# Patient Record
Sex: FEMALE | Race: WHITE | NOT HISPANIC OR LATINO | Employment: FULL TIME | URBAN - METROPOLITAN AREA
[De-identification: names, ages, dates, MRNs, and addresses within clinical notes are randomized per-mention and may not be internally consistent; named-entity substitution may affect disease eponyms.]

---

## 2023-12-04 ENCOUNTER — APPOINTMENT (OUTPATIENT)
Dept: OBSTETRICS AND GYNECOLOGY | Facility: CLINIC | Age: 28
End: 2023-12-04
Payer: COMMERCIAL

## 2023-12-05 ENCOUNTER — TELEPHONE (OUTPATIENT)
Dept: OBSTETRICS AND GYNECOLOGY | Facility: CLINIC | Age: 28
End: 2023-12-05
Payer: COMMERCIAL

## 2023-12-05 ENCOUNTER — APPOINTMENT (OUTPATIENT)
Dept: OBSTETRICS AND GYNECOLOGY | Facility: CLINIC | Age: 28
End: 2023-12-05
Payer: COMMERCIAL

## 2023-12-05 ENCOUNTER — INITIAL PRENATAL (OUTPATIENT)
Dept: OBSTETRICS AND GYNECOLOGY | Facility: CLINIC | Age: 28
End: 2023-12-05
Payer: COMMERCIAL

## 2023-12-05 DIAGNOSIS — Z34.01 ENCOUNTER FOR SUPERVISION OF NORMAL FIRST PREGNANCY IN FIRST TRIMESTER (HHS-HCC): Primary | ICD-10-CM

## 2023-12-05 DIAGNOSIS — Z34.90 PRENATAL CARE, ANTEPARTUM (HHS-HCC): ICD-10-CM

## 2023-12-05 DIAGNOSIS — Z3A.08 8 WEEKS GESTATION OF PREGNANCY (HHS-HCC): ICD-10-CM

## 2023-12-05 PROCEDURE — 87086 URINE CULTURE/COLONY COUNT: CPT

## 2023-12-05 PROCEDURE — 87591 N.GONORRHOEAE DNA AMP PROB: CPT

## 2023-12-05 PROCEDURE — 87491 CHLMYD TRACH DNA AMP PROBE: CPT

## 2023-12-05 PROCEDURE — 87800 DETECT AGNT MULT DNA DIREC: CPT

## 2023-12-05 PROCEDURE — 0500F INITIAL PRENATAL CARE VISIT: CPT | Performed by: OBSTETRICS & GYNECOLOGY

## 2023-12-05 NOTE — LETTER
December 5, 2023     Patient: Snehal Ball   YOB: 1995   Date of Visit: 12/5/2023       To Whom It May Concern:    Snehal Ball was seen in my clinic on 12/5/2023 at ?. Please excuse Snehal for her absence from work on this day to make the appointment.    If you have any questions or concerns, please don't hesitate to call.         Sincerely,         Libia Negrete MD        CC: No Recipients

## 2023-12-06 VITALS
SYSTOLIC BLOOD PRESSURE: 130 MMHG | BODY MASS INDEX: 22.77 KG/M2 | DIASTOLIC BLOOD PRESSURE: 70 MMHG | WEIGHT: 133.4 LBS | HEIGHT: 64 IN

## 2023-12-06 PROBLEM — Z34.01 ENCOUNTER FOR SUPERVISION OF NORMAL FIRST PREGNANCY IN FIRST TRIMESTER (HHS-HCC): Status: ACTIVE | Noted: 2023-12-06

## 2023-12-06 LAB
C TRACH RRNA SPEC QL NAA+PROBE: NEGATIVE
N GONORRHOEA DNA SPEC QL PROBE+SIG AMP: NEGATIVE

## 2023-12-06 NOTE — PROGRESS NOTES
28 y.o.  at 8.2 weeks gestational age by sure LMP.  Planned and desired pregnancy.  USN done today - CRL c/w LMP dating.  EDC per LMP - .  PMH/PSH uncomplicated.  Has nausea but tolerating.  Taking OTC PNV.   S/p flu vaccine.  Updated COVID vaccine recommended. BMI today 23.  Discussed optimal weight gain in pregnancy.  Discussed genetic screening options and carrier screening - unsure - will think about it prior to next visit.  Routine labs next visit as well. Discussed collaborative care model and group practice.  Questions answered.

## 2023-12-07 LAB — BACTERIA UR CULT: NO GROWTH

## 2024-01-08 ENCOUNTER — ANCILLARY PROCEDURE (OUTPATIENT)
Dept: RADIOLOGY | Facility: CLINIC | Age: 29
End: 2024-01-08
Payer: COMMERCIAL

## 2024-01-08 DIAGNOSIS — Z34.90 PRENATAL CARE, ANTEPARTUM (HHS-HCC): ICD-10-CM

## 2024-01-08 DIAGNOSIS — Z36.82 NUCHAL TRANSLUCENCY OF FETUS ON PRENATAL ULTRASOUND (HHS-HCC): ICD-10-CM

## 2024-01-08 PROCEDURE — 76813 OB US NUCHAL MEAS 1 GEST: CPT | Performed by: OBSTETRICS & GYNECOLOGY

## 2024-01-08 PROCEDURE — 76801 OB US < 14 WKS SINGLE FETUS: CPT

## 2024-01-08 PROCEDURE — 76813 OB US NUCHAL MEAS 1 GEST: CPT

## 2024-01-09 ENCOUNTER — ROUTINE PRENATAL (OUTPATIENT)
Dept: OBSTETRICS AND GYNECOLOGY | Facility: CLINIC | Age: 29
End: 2024-01-09
Payer: COMMERCIAL

## 2024-01-09 VITALS — BODY MASS INDEX: 23.72 KG/M2 | WEIGHT: 138.2 LBS | DIASTOLIC BLOOD PRESSURE: 60 MMHG | SYSTOLIC BLOOD PRESSURE: 112 MMHG

## 2024-01-09 DIAGNOSIS — Z3A.13 13 WEEKS GESTATION OF PREGNANCY (HHS-HCC): ICD-10-CM

## 2024-01-09 DIAGNOSIS — Z34.01 ENCOUNTER FOR SUPERVISION OF NORMAL FIRST PREGNANCY IN FIRST TRIMESTER (HHS-HCC): ICD-10-CM

## 2024-01-09 DIAGNOSIS — Z34.90 PRENATAL CARE, ANTEPARTUM (HHS-HCC): Primary | ICD-10-CM

## 2024-01-09 LAB
ABO GROUP (TYPE) IN BLOOD: NORMAL
ANTIBODY SCREEN: NORMAL
ERYTHROCYTE [DISTWIDTH] IN BLOOD BY AUTOMATED COUNT: 12.5 % (ref 11.5–14.5)
HBV SURFACE AG SERPL QL IA: NONREACTIVE
HCT VFR BLD AUTO: 39.6 % (ref 36–46)
HCV AB SER QL: NONREACTIVE
HGB BLD-MCNC: 13.7 G/DL (ref 12–16)
HIV 1+2 AB+HIV1 P24 AG SERPL QL IA: NONREACTIVE
MCH RBC QN AUTO: 30.1 PG (ref 26–34)
MCHC RBC AUTO-ENTMCNC: 34.6 G/DL (ref 32–36)
MCV RBC AUTO: 87 FL (ref 80–100)
NRBC BLD-RTO: 0 /100 WBCS (ref 0–0)
PLATELET # BLD AUTO: 256 X10*3/UL (ref 150–450)
RBC # BLD AUTO: 4.55 X10*6/UL (ref 4–5.2)
REFLEX ADDED, ANEMIA PANEL: NORMAL
RH FACTOR (ANTIGEN D): NORMAL
RUBV IGG SERPL IA-ACNC: 1.2 IA
RUBV IGG SERPL QL IA: POSITIVE
T PALLIDUM AB SER QL: NONREACTIVE
WBC # BLD AUTO: 7.6 X10*3/UL (ref 4.4–11.3)

## 2024-01-09 PROCEDURE — 36415 COLL VENOUS BLD VENIPUNCTURE: CPT

## 2024-01-09 PROCEDURE — 86780 TREPONEMA PALLIDUM: CPT

## 2024-01-09 PROCEDURE — 87340 HEPATITIS B SURFACE AG IA: CPT

## 2024-01-09 PROCEDURE — 87389 HIV-1 AG W/HIV-1&-2 AB AG IA: CPT

## 2024-01-09 PROCEDURE — 86317 IMMUNOASSAY INFECTIOUS AGENT: CPT

## 2024-01-09 PROCEDURE — 86803 HEPATITIS C AB TEST: CPT

## 2024-01-09 PROCEDURE — 0501F PRENATAL FLOW SHEET: CPT | Performed by: OBSTETRICS & GYNECOLOGY

## 2024-01-09 PROCEDURE — 86900 BLOOD TYPING SEROLOGIC ABO: CPT

## 2024-01-09 PROCEDURE — 86901 BLOOD TYPING SEROLOGIC RH(D): CPT

## 2024-01-09 PROCEDURE — 86850 RBC ANTIBODY SCREEN: CPT

## 2024-01-09 PROCEDURE — 85027 COMPLETE CBC AUTOMATED: CPT

## 2024-01-09 NOTE — PROGRESS NOTES
OB labs today with NIPT    Sara Ch MA II    Routine prenatal visit   Feeling well now.  Much improved over the past 1-2 weeks. Had 13 week USN yesterday - WNL.  Routine prenatal labs and NIPS today.  Declines carrier screening. Not candidate for ASA.      Medical Problems       Problem List       Encounter for supervision of normal first pregnancy in first trimester    Overview Signed 12/6/2023  5:20 PM by Libia Negrete MD     - s/p flu vaccine  - Updated COVID vaccine recommended  <> 13 week USN             Follow up in 4 week(s).

## 2024-02-06 ENCOUNTER — ROUTINE PRENATAL (OUTPATIENT)
Dept: OBSTETRICS AND GYNECOLOGY | Facility: CLINIC | Age: 29
End: 2024-02-06
Payer: COMMERCIAL

## 2024-02-06 VITALS
WEIGHT: 142.38 LBS | SYSTOLIC BLOOD PRESSURE: 112 MMHG | DIASTOLIC BLOOD PRESSURE: 72 MMHG | BODY MASS INDEX: 24.44 KG/M2

## 2024-02-06 DIAGNOSIS — R35.0 URINARY FREQUENCY: ICD-10-CM

## 2024-02-06 DIAGNOSIS — Z3A.17 17 WEEKS GESTATION OF PREGNANCY (HHS-HCC): ICD-10-CM

## 2024-02-06 DIAGNOSIS — Z34.02 ENCOUNTER FOR SUPERVISION OF NORMAL FIRST PREGNANCY IN SECOND TRIMESTER (HHS-HCC): Primary | ICD-10-CM

## 2024-02-06 LAB
POC APPEARANCE, URINE: CLEAR
POC BILIRUBIN, URINE: NEGATIVE
POC BLOOD, URINE: NEGATIVE
POC COLOR, URINE: YELLOW
POC GLUCOSE, URINE: NEGATIVE MG/DL
POC KETONES, URINE: NEGATIVE MG/DL
POC LEUKOCYTES, URINE: NEGATIVE
POC NITRITE,URINE: NEGATIVE
POC PH, URINE: 6 PH
POC PROTEIN, URINE: NEGATIVE MG/DL
POC SPECIFIC GRAVITY, URINE: 1.01
POC UROBILINOGEN, URINE: 0.2 EU/DL

## 2024-02-06 PROCEDURE — 81003 URINALYSIS AUTO W/O SCOPE: CPT | Performed by: OBSTETRICS & GYNECOLOGY

## 2024-02-06 PROCEDURE — 0501F PRENATAL FLOW SHEET: CPT | Performed by: OBSTETRICS & GYNECOLOGY

## 2024-02-06 NOTE — PROGRESS NOTES
"Patient complains of urinary frequency and some vaginal discomfort.    Pauline Hobbs MA    Routine prenatal visit   Pt complains of uncomfortable sensation yesterday and felt like she was voiding frequently.  She also had some mild vaginal discomfort.  It seems better today. No change in her discharge.  Denies odor.      Speculum exam done today - small amount of physiologic discharge.  UA in office remarkable.  Given that symptoms resolved today will manage expectantly.  Encouraged her to call if her urinary symptoms recur - will send urine cx at that time.     Reviewed normal 13 week USN.  Has anatomy USN scheduled. Risk-reducing NIPs - it's a GIRL.     Medical Problems       Problem List       Encounter for supervision of normal first pregnancy in first trimester    Overview Addendum 2/6/2024  1:50 PM by Libia Negrete MD     - s/p flu vaccine  - Updated COVID vaccine recommended  - Normal 13 week USN   <> Anatomy USN scheduled   - Risk reducing NIPS   - It's a GIRL - \"Cassi\"             Follow up in 4 week(s).    "

## 2024-02-19 ENCOUNTER — HOSPITAL ENCOUNTER (OUTPATIENT)
Dept: RADIOLOGY | Facility: CLINIC | Age: 29
Discharge: HOME | End: 2024-02-19
Payer: COMMERCIAL

## 2024-02-19 DIAGNOSIS — Z34.90 PRENATAL CARE, ANTEPARTUM (HHS-HCC): ICD-10-CM

## 2024-02-19 PROCEDURE — 76815 OB US LIMITED FETUS(S): CPT | Performed by: OBSTETRICS & GYNECOLOGY

## 2024-02-19 PROCEDURE — 76805 OB US >/= 14 WKS SNGL FETUS: CPT

## 2024-03-05 ENCOUNTER — HOSPITAL ENCOUNTER (OUTPATIENT)
Dept: RADIOLOGY | Facility: CLINIC | Age: 29
Discharge: HOME | End: 2024-03-05
Payer: COMMERCIAL

## 2024-03-05 DIAGNOSIS — Z34.90 PRENATAL CARE, ANTEPARTUM (HHS-HCC): ICD-10-CM

## 2024-03-05 PROCEDURE — 76816 OB US FOLLOW-UP PER FETUS: CPT

## 2024-03-05 PROCEDURE — 76816 OB US FOLLOW-UP PER FETUS: CPT | Performed by: STUDENT IN AN ORGANIZED HEALTH CARE EDUCATION/TRAINING PROGRAM

## 2024-03-06 ENCOUNTER — ROUTINE PRENATAL (OUTPATIENT)
Dept: OBSTETRICS AND GYNECOLOGY | Facility: CLINIC | Age: 29
End: 2024-03-06
Payer: COMMERCIAL

## 2024-03-06 VITALS — SYSTOLIC BLOOD PRESSURE: 116 MMHG | WEIGHT: 152 LBS | BODY MASS INDEX: 26.09 KG/M2 | DIASTOLIC BLOOD PRESSURE: 66 MMHG

## 2024-03-06 DIAGNOSIS — Z3A.21 21 WEEKS GESTATION OF PREGNANCY (HHS-HCC): ICD-10-CM

## 2024-03-06 DIAGNOSIS — Z34.02 ENCOUNTER FOR SUPERVISION OF NORMAL FIRST PREGNANCY IN SECOND TRIMESTER (HHS-HCC): Primary | ICD-10-CM

## 2024-03-06 PROCEDURE — 0501F PRENATAL FLOW SHEET: CPT | Performed by: OBSTETRICS & GYNECOLOGY

## 2024-03-06 NOTE — PROGRESS NOTES
"Glucose and 28 week folder given for next visit.  C/O: None     Sara Ch MA II    Routine prenatal visit   No complaints.  Feeling FM daily now.  Has had 2 anatomy USNs but still incomplete - f/u USN scheduled.  Given glucola supplies for next visit.     Medical Problems       Problem List       Encounter for supervision of normal first pregnancy in first trimester    Overview Addendum 3/6/2024  5:07 PM by Libia Negrete MD     - s/p flu vaccine  - Updated COVID vaccine recommended  - Normal 13 week USN    - Risk reducing NIPS   - It's a GIRL - \"Cassi\"    - incomplete anatomy USN x 2 --> f/u USN scheduled              Follow up in 4 week(s).    "

## 2024-03-19 ENCOUNTER — HOSPITAL ENCOUNTER (OUTPATIENT)
Dept: RADIOLOGY | Facility: CLINIC | Age: 29
Discharge: HOME | End: 2024-03-19
Payer: COMMERCIAL

## 2024-03-19 DIAGNOSIS — Z34.90 PRENATAL CARE, ANTEPARTUM (HHS-HCC): ICD-10-CM

## 2024-03-19 PROCEDURE — 76816 OB US FOLLOW-UP PER FETUS: CPT | Performed by: OBSTETRICS & GYNECOLOGY

## 2024-03-19 PROCEDURE — 76816 OB US FOLLOW-UP PER FETUS: CPT

## 2024-04-05 ENCOUNTER — ROUTINE PRENATAL (OUTPATIENT)
Dept: OBSTETRICS AND GYNECOLOGY | Facility: CLINIC | Age: 29
End: 2024-04-05
Payer: COMMERCIAL

## 2024-04-05 VITALS
WEIGHT: 158.38 LBS | SYSTOLIC BLOOD PRESSURE: 112 MMHG | DIASTOLIC BLOOD PRESSURE: 60 MMHG | BODY MASS INDEX: 27.19 KG/M2

## 2024-04-05 DIAGNOSIS — Z34.01 ENCOUNTER FOR SUPERVISION OF NORMAL FIRST PREGNANCY IN FIRST TRIMESTER (HHS-HCC): ICD-10-CM

## 2024-04-05 DIAGNOSIS — Z3A.25 25 WEEKS GESTATION OF PREGNANCY (HHS-HCC): Primary | ICD-10-CM

## 2024-04-05 DIAGNOSIS — Z13.1 SCREENING FOR DIABETES MELLITUS (DM): ICD-10-CM

## 2024-04-05 LAB
ABO GROUP (TYPE) IN BLOOD: NORMAL
ANTIBODY SCREEN: NORMAL
ERYTHROCYTE [DISTWIDTH] IN BLOOD BY AUTOMATED COUNT: 13 % (ref 11.5–14.5)
GLUCOSE 1H P 50 G GLC PO SERPL-MCNC: 74 MG/DL
HCT VFR BLD AUTO: 33.6 % (ref 36–46)
HGB BLD-MCNC: 11.4 G/DL (ref 12–16)
MCH RBC QN AUTO: 30.2 PG (ref 26–34)
MCHC RBC AUTO-ENTMCNC: 33.9 G/DL (ref 32–36)
MCV RBC AUTO: 89 FL (ref 80–100)
NRBC BLD-RTO: 0 /100 WBCS (ref 0–0)
PLATELET # BLD AUTO: 193 X10*3/UL (ref 150–450)
RBC # BLD AUTO: 3.78 X10*6/UL (ref 4–5.2)
RH FACTOR (ANTIGEN D): NORMAL
WBC # BLD AUTO: 8.6 X10*3/UL (ref 4.4–11.3)

## 2024-04-05 PROCEDURE — 86900 BLOOD TYPING SEROLOGIC ABO: CPT

## 2024-04-05 PROCEDURE — 85027 COMPLETE CBC AUTOMATED: CPT

## 2024-04-05 PROCEDURE — 0501F PRENATAL FLOW SHEET: CPT

## 2024-04-05 PROCEDURE — 86850 RBC ANTIBODY SCREEN: CPT

## 2024-04-05 PROCEDURE — 82947 ASSAY GLUCOSE BLOOD QUANT: CPT

## 2024-04-05 PROCEDURE — 86901 BLOOD TYPING SEROLOGIC RH(D): CPT

## 2024-04-05 PROCEDURE — 36415 COLL VENOUS BLD VENIPUNCTURE: CPT

## 2024-04-05 ASSESSMENT — EDINBURGH POSTNATAL DEPRESSION SCALE (EPDS)
I HAVE BEEN SO UNHAPPY THAT I HAVE BEEN CRYING: NO, NEVER
THINGS HAVE BEEN GETTING ON TOP OF ME: NO, I HAVE BEEN COPING AS WELL AS EVER
TOTAL SCORE: 6
I HAVE BEEN ANXIOUS OR WORRIED FOR NO GOOD REASON: YES, SOMETIMES
I HAVE BEEN SO UNHAPPY THAT I HAVE HAD DIFFICULTY SLEEPING: NOT AT ALL
I HAVE BEEN ABLE TO LAUGH AND SEE THE FUNNY SIDE OF THINGS: AS MUCH AS I ALWAYS COULD
I HAVE FELT SCARED OR PANICKY FOR NO GOOD REASON: YES, SOMETIMES
I HAVE FELT SAD OR MISERABLE: NO, NOT AT ALL
I HAVE LOOKED FORWARD WITH ENJOYMENT TO THINGS: AS MUCH AS I EVER DID
I HAVE BLAMED MYSELF UNNECESSARILY WHEN THINGS WENT WRONG: YES, SOME OF THE TIME
THE THOUGHT OF HARMING MYSELF HAS OCCURRED TO ME: NEVER

## 2024-04-05 NOTE — PROGRESS NOTES
"Layla Ball is a 29 y.o.  at 25w5d with a working estimated date of delivery of 2024, by Last Menstrual Period who presents for a routine prenatal visit. She denies vaginal bleeding, leakage of fluid, or contractions. Patient reports feeling fetal movement and compliance with PNV.     Review of completed anatomy scan shows decreased interval growth.  Recommended that patient have repeat growth scan in 3 weeks.  Growth scan not yet scheduled patient instructed on how to schedule.  Current Outpatient Medications on File Prior to Visit   Medication Sig Dispense Refill    PNV 12-iron-methylfolate-dha 29 mg iron-1 mg -350 mg comb pack,tablet DR,capsule DR Take 1 tablet by mouth once daily.       No current facility-administered medications on file prior to visit.        Her pregnancy is complicated by:  Medical Problems       Problem List       Encounter for supervision of normal first pregnancy in first trimester    Overview Addendum 3/6/2024  5:07 PM by Libia Negrete MD     - s/p flu vaccine  - Updated COVID vaccine recommended  - Normal 13 week USN    - Risk reducing NIPS   - It's a GIRL - \"Cassi\"    - incomplete anatomy USN x 2 --> f/u USN scheduled                 Objective   Weight: 71.8 kg (158 lb 6 oz)  Expected Total Weight Gain: 11.5 kg (25 lb)-16 kg (35 lb)   TW kg (26 lb 6 oz)  Pregravid BMI: 22.65      BP: 112/60          Prenatal Labs:  Lab Results   Component Value Date    HGB 13.7 2024    HCT 39.6 2024     2024    ABO O 2024    LABRH NEG 2024    NEISSGONOAMP Negative 2023    CHLAMTRACAMP Negative 2023    SYPHT Nonreactive 2024    HEPBSAG Nonreactive 2024    HIV1X2 Nonreactive 2024    URINECULTURE No growth 2023       Assessment/Plan   One hour and CBC today  TDAP and rhogam at next visit  Reviewed growth scan  Follow up in 3 weeks for a routine prenatal visit.    Jocelyn Russ, " APRN-CNM

## 2024-04-06 LAB — REFLEX ADDED, ANEMIA PANEL: NORMAL

## 2024-04-17 ENCOUNTER — HOSPITAL ENCOUNTER (OUTPATIENT)
Dept: RADIOLOGY | Facility: CLINIC | Age: 29
Discharge: HOME | End: 2024-04-17
Payer: COMMERCIAL

## 2024-04-17 DIAGNOSIS — Z34.90 PRENATAL CARE, ANTEPARTUM (HHS-HCC): ICD-10-CM

## 2024-04-17 PROCEDURE — 76819 FETAL BIOPHYS PROFIL W/O NST: CPT | Performed by: STUDENT IN AN ORGANIZED HEALTH CARE EDUCATION/TRAINING PROGRAM

## 2024-04-17 PROCEDURE — 76819 FETAL BIOPHYS PROFIL W/O NST: CPT

## 2024-04-17 PROCEDURE — 76816 OB US FOLLOW-UP PER FETUS: CPT

## 2024-04-17 PROCEDURE — 76816 OB US FOLLOW-UP PER FETUS: CPT | Performed by: STUDENT IN AN ORGANIZED HEALTH CARE EDUCATION/TRAINING PROGRAM

## 2024-04-25 ENCOUNTER — ROUTINE PRENATAL (OUTPATIENT)
Dept: OBSTETRICS AND GYNECOLOGY | Facility: CLINIC | Age: 29
End: 2024-04-25
Payer: COMMERCIAL

## 2024-04-25 VITALS — BODY MASS INDEX: 28.6 KG/M2 | DIASTOLIC BLOOD PRESSURE: 60 MMHG | WEIGHT: 166.6 LBS | SYSTOLIC BLOOD PRESSURE: 132 MMHG

## 2024-04-25 DIAGNOSIS — Z34.01 ENCOUNTER FOR SUPERVISION OF NORMAL FIRST PREGNANCY IN FIRST TRIMESTER (HHS-HCC): ICD-10-CM

## 2024-04-25 DIAGNOSIS — Z3A.28 28 WEEKS GESTATION OF PREGNANCY (HHS-HCC): ICD-10-CM

## 2024-04-25 LAB
ABO GROUP (TYPE) IN BLOOD: NORMAL
ANTIBODY SCREEN: NORMAL
RH FACTOR (ANTIGEN D): NORMAL

## 2024-04-25 PROCEDURE — 86850 RBC ANTIBODY SCREEN: CPT

## 2024-04-25 PROCEDURE — 90715 TDAP VACCINE 7 YRS/> IM: CPT | Performed by: ADVANCED PRACTICE MIDWIFE

## 2024-04-25 PROCEDURE — 86900 BLOOD TYPING SEROLOGIC ABO: CPT

## 2024-04-25 PROCEDURE — 90471 IMMUNIZATION ADMIN: CPT | Performed by: ADVANCED PRACTICE MIDWIFE

## 2024-04-25 PROCEDURE — 36415 COLL VENOUS BLD VENIPUNCTURE: CPT

## 2024-04-25 PROCEDURE — 86901 BLOOD TYPING SEROLOGIC RH(D): CPT

## 2024-04-25 PROCEDURE — 96372 THER/PROPH/DIAG INJ SC/IM: CPT | Performed by: ADVANCED PRACTICE MIDWIFE

## 2024-04-25 NOTE — PROGRESS NOTES
Pt presents today for TDAP and Rhogam vacc     Pt educated on Tdap and given PI sheet for review at her leisure.   Pt educated that this injection may make her arm feel sore for a couple of days.  Pt verbalized understanding & agreed to administration.   Pt tolerated well.  All questions and concerns were addressed.  TDAP injection given without incident.  Lot#    Exp# 06/20/2026  NDC# 66357-788-23  Given in: RT arm    Keya Bansal MA II/ Krystal massey      Patient presents today for Rhogam IM Injection.  Lot#: [id number]  Exp: 06/24/2026  NDC: EO12V54  Pt given Rhogam without difficulty.  Office supplied.  Pt tolerated injection well.  Education offered.    Keya Bansal MA II/ Krystal massey

## 2024-05-01 ENCOUNTER — ROUTINE PRENATAL (OUTPATIENT)
Dept: OBSTETRICS AND GYNECOLOGY | Facility: CLINIC | Age: 29
End: 2024-05-01
Payer: COMMERCIAL

## 2024-05-01 VITALS
DIASTOLIC BLOOD PRESSURE: 52 MMHG | WEIGHT: 165.13 LBS | SYSTOLIC BLOOD PRESSURE: 118 MMHG | BODY MASS INDEX: 28.34 KG/M2

## 2024-05-01 DIAGNOSIS — O26.893 RH NEGATIVE STATUS DURING PREGNANCY IN THIRD TRIMESTER (HHS-HCC): ICD-10-CM

## 2024-05-01 DIAGNOSIS — Z67.91 RH NEGATIVE STATUS DURING PREGNANCY IN THIRD TRIMESTER (HHS-HCC): ICD-10-CM

## 2024-05-01 DIAGNOSIS — Z3A.29 29 WEEKS GESTATION OF PREGNANCY (HHS-HCC): ICD-10-CM

## 2024-05-01 DIAGNOSIS — Z34.03 ENCOUNTER FOR SUPERVISION OF NORMAL FIRST PREGNANCY IN THIRD TRIMESTER (HHS-HCC): Primary | ICD-10-CM

## 2024-05-01 PROCEDURE — 0501F PRENATAL FLOW SHEET: CPT | Performed by: OBSTETRICS & GYNECOLOGY

## 2024-05-01 NOTE — PROGRESS NOTES
"Routine prenatal visit   No complaints today.  Did feel uncomfortable after long work shift with pelvic pressure - got belly band but hasn't tried to wear it yet.  Normal FM.  Had USN at 27 weeks - decreased interval growth and overall growth as well as AC now 12%.  F/u growth scheduled 5/16.    Medical Problems       Problem List       Encounter for supervision of normal first pregnancy in first trimester ([facility])    Overview Addendum 5/1/2024  3:07 PM by Libia Negrete MD     - s/p flu vaccine  - Updated COVID vaccine recommended  - Normal 13 week USN    - Risk reducing NIPS   - It's a GIRL - \"Cassi\"  - s/p Tdap     - USN 27 weeks - EFW and AC both 12%  with decreased interval growth --> f/u growth in 4 weeks recommended - scheduled 5/16          Rh negative status during pregnancy in third trimester ([facility])    Overview Signed 5/1/2024  3:06 PM by Libia Negrete MD     - s/p Rhogam 4/25              Follow up in 2 week(s).    "

## 2024-05-16 ENCOUNTER — HOSPITAL ENCOUNTER (OUTPATIENT)
Dept: RADIOLOGY | Facility: CLINIC | Age: 29
Discharge: HOME | End: 2024-05-16
Payer: COMMERCIAL

## 2024-05-16 DIAGNOSIS — Z34.90 PRENATAL CARE, ANTEPARTUM (HHS-HCC): ICD-10-CM

## 2024-05-16 DIAGNOSIS — O36.5990 FETAL GROWTH RESTRICTION ANTEPARTUM (HHS-HCC): ICD-10-CM

## 2024-05-16 PROCEDURE — 76820 UMBILICAL ARTERY ECHO: CPT

## 2024-05-16 PROCEDURE — 76819 FETAL BIOPHYS PROFIL W/O NST: CPT | Performed by: OBSTETRICS & GYNECOLOGY

## 2024-05-16 PROCEDURE — 76816 OB US FOLLOW-UP PER FETUS: CPT

## 2024-05-16 PROCEDURE — 76816 OB US FOLLOW-UP PER FETUS: CPT | Performed by: OBSTETRICS & GYNECOLOGY

## 2024-05-16 PROCEDURE — 76820 UMBILICAL ARTERY ECHO: CPT | Performed by: OBSTETRICS & GYNECOLOGY

## 2024-05-16 PROCEDURE — 76819 FETAL BIOPHYS PROFIL W/O NST: CPT

## 2024-05-17 ENCOUNTER — ROUTINE PRENATAL (OUTPATIENT)
Dept: OBSTETRICS AND GYNECOLOGY | Facility: CLINIC | Age: 29
End: 2024-05-17
Payer: COMMERCIAL

## 2024-05-17 VITALS — WEIGHT: 172.6 LBS | DIASTOLIC BLOOD PRESSURE: 68 MMHG | BODY MASS INDEX: 29.63 KG/M2 | SYSTOLIC BLOOD PRESSURE: 118 MMHG

## 2024-05-17 DIAGNOSIS — Z67.91 RH NEGATIVE STATUS DURING PREGNANCY IN THIRD TRIMESTER (HHS-HCC): ICD-10-CM

## 2024-05-17 DIAGNOSIS — Z34.03 ENCOUNTER FOR SUPERVISION OF NORMAL FIRST PREGNANCY IN THIRD TRIMESTER (HHS-HCC): ICD-10-CM

## 2024-05-17 DIAGNOSIS — O26.893 RH NEGATIVE STATUS DURING PREGNANCY IN THIRD TRIMESTER (HHS-HCC): ICD-10-CM

## 2024-05-17 DIAGNOSIS — O36.5990 FETAL GROWTH RESTRICTION ANTEPARTUM (HHS-HCC): Primary | ICD-10-CM

## 2024-05-17 DIAGNOSIS — Z3A.31 31 WEEKS GESTATION OF PREGNANCY (HHS-HCC): ICD-10-CM

## 2024-05-17 PROCEDURE — 0501F PRENATAL FLOW SHEET: CPT | Performed by: OBSTETRICS & GYNECOLOGY

## 2024-05-17 NOTE — PROGRESS NOTES
"Questions about growth ultrasound.  Feeling well.    Routine prenatal visit   Had growth USN yesterday.  EFW 11% overall but AC 4%.  Discussed weekly doppler/BPPs for this. Already has these scheduled. Discussed delivery timing if IUGR persists.  Also discussed breech presentation.      Medical Problems       Problem List       Encounter for supervision of normal first pregnancy in first trimester ([facility])    Overview Addendum 5/17/2024 10:40 AM by Libia Negrete MD     - s/p flu vaccine  - Updated COVID vaccine recommended  - Normal 13 week USN    - Risk reducing NIPS   - It's a GIRL - \"Cassi\"  - s/p Tdap          Rh negative status during pregnancy in third trimester ([facility])    Overview Signed 5/1/2024  3:06 PM by Libia Negrete MD     - s/p Rhogam 4/25          Fetal growth restriction antepartum ([facility])    Overview Signed 5/17/2024 10:40 AM by Libia Negrete MD     - EFW 11% at 31.4 but AC 4%  <> weekly BPP/dopplers              Follow up in 2 week(s).    "

## 2024-05-23 ENCOUNTER — HOSPITAL ENCOUNTER (OUTPATIENT)
Dept: RADIOLOGY | Facility: HOSPITAL | Age: 29
Discharge: HOME | End: 2024-05-23
Payer: COMMERCIAL

## 2024-05-23 DIAGNOSIS — Z34.90 PRENATAL CARE, ANTEPARTUM (HHS-HCC): ICD-10-CM

## 2024-05-23 PROCEDURE — 76820 UMBILICAL ARTERY ECHO: CPT | Performed by: OBSTETRICS & GYNECOLOGY

## 2024-05-23 PROCEDURE — 76821 MIDDLE CEREBRAL ARTERY ECHO: CPT | Performed by: OBSTETRICS & GYNECOLOGY

## 2024-05-23 PROCEDURE — 76820 UMBILICAL ARTERY ECHO: CPT

## 2024-05-23 PROCEDURE — 76819 FETAL BIOPHYS PROFIL W/O NST: CPT | Performed by: OBSTETRICS & GYNECOLOGY

## 2024-05-23 PROCEDURE — 76819 FETAL BIOPHYS PROFIL W/O NST: CPT

## 2024-05-30 ENCOUNTER — HOSPITAL ENCOUNTER (OUTPATIENT)
Dept: RADIOLOGY | Facility: CLINIC | Age: 29
Discharge: HOME | End: 2024-05-30
Payer: COMMERCIAL

## 2024-05-30 DIAGNOSIS — Z34.90 PRENATAL CARE, ANTEPARTUM (HHS-HCC): ICD-10-CM

## 2024-05-30 PROCEDURE — 76820 UMBILICAL ARTERY ECHO: CPT

## 2024-05-30 PROCEDURE — 76820 UMBILICAL ARTERY ECHO: CPT | Performed by: STUDENT IN AN ORGANIZED HEALTH CARE EDUCATION/TRAINING PROGRAM

## 2024-05-30 PROCEDURE — 76819 FETAL BIOPHYS PROFIL W/O NST: CPT | Performed by: STUDENT IN AN ORGANIZED HEALTH CARE EDUCATION/TRAINING PROGRAM

## 2024-05-30 PROCEDURE — 76819 FETAL BIOPHYS PROFIL W/O NST: CPT

## 2024-05-30 NOTE — PROGRESS NOTES
"Layla Ball is a 29 y.o.  at 33w4d with a working estimated date of delivery of 2024, by Last Menstrual Period who presents for a routine prenatal visit.    Reviewed weekly BPP and doppler for FGR - Normal doppler indices with the RI at the 56%  - BPP .   -Fetus remains in breech presentation.     Discussed spinning babies and chiropractic care to facilitate fetal rotation to vertex presentation    Her pregnancy is complicated by:  Medical Problems       Problem List       Encounter for supervision of normal first pregnancy in first trimester ([facility])    Overview Addendum 2024 10:52 AM by Libia Negrete MD     - s/p flu vaccine  - Updated COVID vaccine recommended  - Normal 13 week USN    - Risk reducing NIPS   - It's a GIRL - \"Cassi\"  - Has breast pump   - s/p Tdap          Rh negative status during pregnancy in third trimester ([facility])    Overview Signed 2024  3:06 PM by Libia Negrete MD     - s/p Rhogam           Fetal growth restriction antepartum ([facility])    Overview Signed 2024 10:40 AM by Libia Negrete MD     - EFW 11% at 31.4 but AC 4%  <> weekly BPP/dopplers                Objective      TW.4 kg (40 lb 9.6 oz)  Pregravid BMI: 22.65      Prenatal Labs:  Lab Results   Component Value Date    HGB 11.4 (L) 2024    HCT 33.6 (L) 2024     2024    ABO O 2024    LABRH NEG 2024    NEISSGONOAMP Negative 2023    CHLAMTRACAMP Negative 2023    SYPHT Nonreactive 2024    HEPBSAG Nonreactive 2024    HIV1X2 Nonreactive 2024    URINECULTURE No growth 2023       Assessment/Plan     Follow up in 2 weeks for a routine prenatal visit.    Jocelyn Russ, MANUELA-TAL   "

## 2024-05-31 ENCOUNTER — ROUTINE PRENATAL (OUTPATIENT)
Dept: OBSTETRICS AND GYNECOLOGY | Facility: CLINIC | Age: 29
End: 2024-05-31
Payer: COMMERCIAL

## 2024-05-31 VITALS — SYSTOLIC BLOOD PRESSURE: 117 MMHG | DIASTOLIC BLOOD PRESSURE: 75 MMHG | WEIGHT: 173.6 LBS | BODY MASS INDEX: 29.8 KG/M2

## 2024-05-31 DIAGNOSIS — O36.5990 FETAL GROWTH RESTRICTION ANTEPARTUM (HHS-HCC): ICD-10-CM

## 2024-05-31 DIAGNOSIS — Z3A.33 33 WEEKS GESTATION OF PREGNANCY (HHS-HCC): Primary | ICD-10-CM

## 2024-05-31 PROCEDURE — 0501F PRENATAL FLOW SHEET: CPT

## 2024-06-06 ENCOUNTER — HOSPITAL ENCOUNTER (OUTPATIENT)
Dept: RADIOLOGY | Facility: CLINIC | Age: 29
Discharge: HOME | End: 2024-06-06
Payer: COMMERCIAL

## 2024-06-06 DIAGNOSIS — O36.5990 FETAL GROWTH RESTRICTION ANTEPARTUM (HHS-HCC): ICD-10-CM

## 2024-06-06 DIAGNOSIS — Z34.90 PRENATAL CARE, ANTEPARTUM (HHS-HCC): ICD-10-CM

## 2024-06-06 PROCEDURE — 76816 OB US FOLLOW-UP PER FETUS: CPT | Performed by: OBSTETRICS & GYNECOLOGY

## 2024-06-06 PROCEDURE — 76819 FETAL BIOPHYS PROFIL W/O NST: CPT

## 2024-06-06 PROCEDURE — 76820 UMBILICAL ARTERY ECHO: CPT | Performed by: OBSTETRICS & GYNECOLOGY

## 2024-06-06 PROCEDURE — 76820 UMBILICAL ARTERY ECHO: CPT

## 2024-06-06 PROCEDURE — 76816 OB US FOLLOW-UP PER FETUS: CPT

## 2024-06-06 PROCEDURE — 76819 FETAL BIOPHYS PROFIL W/O NST: CPT | Performed by: OBSTETRICS & GYNECOLOGY

## 2024-06-13 ENCOUNTER — HOSPITAL ENCOUNTER (OUTPATIENT)
Dept: RADIOLOGY | Facility: CLINIC | Age: 29
Discharge: HOME | End: 2024-06-13
Payer: COMMERCIAL

## 2024-06-13 DIAGNOSIS — Z34.90 PRENATAL CARE, ANTEPARTUM (HHS-HCC): ICD-10-CM

## 2024-06-13 PROCEDURE — 76819 FETAL BIOPHYS PROFIL W/O NST: CPT

## 2024-06-13 PROCEDURE — 76820 UMBILICAL ARTERY ECHO: CPT

## 2024-06-14 ENCOUNTER — APPOINTMENT (OUTPATIENT)
Dept: OBSTETRICS AND GYNECOLOGY | Facility: CLINIC | Age: 29
End: 2024-06-14
Payer: COMMERCIAL

## 2024-06-14 VITALS — BODY MASS INDEX: 31.1 KG/M2 | SYSTOLIC BLOOD PRESSURE: 132 MMHG | DIASTOLIC BLOOD PRESSURE: 80 MMHG | WEIGHT: 181.2 LBS

## 2024-06-14 DIAGNOSIS — O26.893 RH NEGATIVE STATUS DURING PREGNANCY IN THIRD TRIMESTER (HHS-HCC): ICD-10-CM

## 2024-06-14 DIAGNOSIS — Z67.91 RH NEGATIVE STATUS DURING PREGNANCY IN THIRD TRIMESTER (HHS-HCC): ICD-10-CM

## 2024-06-14 DIAGNOSIS — Z34.03 ENCOUNTER FOR SUPERVISION OF NORMAL FIRST PREGNANCY IN THIRD TRIMESTER (HHS-HCC): Primary | ICD-10-CM

## 2024-06-14 DIAGNOSIS — O36.5990 FETAL GROWTH RESTRICTION ANTEPARTUM (HHS-HCC): ICD-10-CM

## 2024-06-14 DIAGNOSIS — Z3A.35 35 WEEKS GESTATION OF PREGNANCY (HHS-HCC): ICD-10-CM

## 2024-06-14 PROCEDURE — 0501F PRENATAL FLOW SHEET: CPT | Performed by: OBSTETRICS & GYNECOLOGY

## 2024-06-14 NOTE — PROGRESS NOTES
"Review BPP from yesterday.   C/O: None    Sara Ch MA II    Routine prenatal visit   No new complaints today.  Discussed management options for persistent breech presentation.   Pros/cons of ECV vs primary c/s discussed.  Given IUGR based on AC, recommendation is for delivery 38.0-39.0.  Pt will have 1 additional growth USN on 6/27.  For now, will work on scheduling c/s during that window but pt aware it may change pending growth on that USN. Continue weekly BPPs in the mean time. GBS next visit.     Medical Problems       Problem List       Encounter for supervision of normal first pregnancy in first trimester ([facility])    Overview Addendum 5/17/2024 10:52 AM by Libia Negrete MD     - s/p flu vaccine  - Updated COVID vaccine recommended  - Normal 13 week USN    - Risk reducing NIPS   - It's a GIRL - \"Cassi\"  - Has breast pump   - s/p Tdap          Rh negative status during pregnancy in third trimester ([facility])    Overview Signed 5/1/2024  3:06 PM by Libia Negrete MD     - s/p Rhogam 4/25          Fetal growth restriction antepartum ([facility])    Overview Addendum 6/10/2024  8:06 AM by Libia Negrete MD     - EFW 12% at 34.4 with AC 9%  <> Continue weekly BPP/dopplers          Breech presentation ([facility])    Overview Signed 6/10/2024  8:05 AM by Libia Negrete MD     - breech at 34.4 USN              Follow up in 1 week(s).    "

## 2024-06-18 ENCOUNTER — PREP FOR PROCEDURE (OUTPATIENT)
Dept: OBSTETRICS AND GYNECOLOGY | Facility: HOSPITAL | Age: 29
End: 2024-06-18
Payer: COMMERCIAL

## 2024-06-18 ENCOUNTER — TELEPHONE (OUTPATIENT)
Dept: OBSTETRICS AND GYNECOLOGY | Facility: CLINIC | Age: 29
End: 2024-06-18
Payer: COMMERCIAL

## 2024-06-18 ENCOUNTER — HOSPITAL ENCOUNTER (OUTPATIENT)
Facility: HOSPITAL | Age: 29
Setting detail: SURGERY ADMIT
End: 2024-06-18
Attending: OBSTETRICS & GYNECOLOGY | Admitting: OBSTETRICS & GYNECOLOGY
Payer: COMMERCIAL

## 2024-06-18 NOTE — TELEPHONE ENCOUNTER
Patient called to speak with Penelope regarding a date that she preferred for her  and wanted to know if the  Could do it that day. ()

## 2024-06-20 ENCOUNTER — HOSPITAL ENCOUNTER (OUTPATIENT)
Dept: RADIOLOGY | Facility: CLINIC | Age: 29
Discharge: HOME | End: 2024-06-20
Payer: COMMERCIAL

## 2024-06-20 DIAGNOSIS — O36.5930 MATERNAL CARE FOR OTHER KNOWN OR SUSPECTED POOR FETAL GROWTH, THIRD TRIMESTER, NOT APPLICABLE OR UNSPECIFIED (HHS-HCC): ICD-10-CM

## 2024-06-20 DIAGNOSIS — Z34.90 PRENATAL CARE, ANTEPARTUM (HHS-HCC): ICD-10-CM

## 2024-06-20 PROCEDURE — 76820 UMBILICAL ARTERY ECHO: CPT

## 2024-06-20 PROCEDURE — 76819 FETAL BIOPHYS PROFIL W/O NST: CPT

## 2024-06-21 ENCOUNTER — APPOINTMENT (OUTPATIENT)
Dept: OBSTETRICS AND GYNECOLOGY | Facility: CLINIC | Age: 29
End: 2024-06-21
Payer: COMMERCIAL

## 2024-06-21 VITALS — WEIGHT: 182 LBS | DIASTOLIC BLOOD PRESSURE: 73 MMHG | SYSTOLIC BLOOD PRESSURE: 127 MMHG | BODY MASS INDEX: 31.24 KG/M2

## 2024-06-21 DIAGNOSIS — Z3A.36 36 WEEKS GESTATION OF PREGNANCY (HHS-HCC): ICD-10-CM

## 2024-06-21 PROCEDURE — 87081 CULTURE SCREEN ONLY: CPT

## 2024-06-21 PROCEDURE — 0501F PRENATAL FLOW SHEET: CPT | Performed by: OBSTETRICS & GYNECOLOGY

## 2024-06-21 NOTE — PROGRESS NOTES
"Review BPP - 6/20/24  GBS today, NKDA  C/O: None     Sara Ch MA II    Routine prenatal visit   Feeling well.  Has some upper abdominal discomfort where the fetal vertex is.  Continue weekly BPPs.  Primary c/s scheduled due to breech presentation on 7/5 at 38.5 due to IUGR.  Has 1 additional growth USN on 6/27.  GBS done today.     Medical Problems       Problem List       Encounter for supervision of normal first pregnancy in first trimester ([facility])    Overview Addendum 5/17/2024 10:52 AM by Libia Negrete MD     - s/p flu vaccine  - Updated COVID vaccine recommended  - Normal 13 week USN    - Risk reducing NIPS   - It's a GIRL - \"Cassi\"  - Has breast pump   - s/p Tdap          Rh negative status during pregnancy in third trimester ([facility])    Overview Signed 5/1/2024  3:06 PM by Libia Negrete MD     - s/p Rhogam 4/25          Fetal growth restriction antepartum ([facility])    Overview Addendum 6/10/2024  8:06 AM by Libia Negrete MD     - EFW 12% at 34.4 with AC 9%  <> Continue weekly BPP/dopplers          Breech presentation ([facility])    Overview Addendum 6/21/2024  9:39 AM by Libia Negrete MD     - breech at 34.4 USN   - Primary c/s scheduled on 7/5 at 38.5              Follow up in 1 week(s).    "

## 2024-06-23 LAB — GP B STREP GENITAL QL CULT: NORMAL

## 2024-06-24 LAB — GP B STREP GENITAL QL CULT: NORMAL

## 2024-06-26 ENCOUNTER — HOSPITAL ENCOUNTER (INPATIENT)
Facility: HOSPITAL | Age: 29
LOS: 3 days | Discharge: HOME | End: 2024-06-29
Attending: OBSTETRICS & GYNECOLOGY | Admitting: OBSTETRICS & GYNECOLOGY
Payer: COMMERCIAL

## 2024-06-26 ENCOUNTER — ANESTHESIA (OUTPATIENT)
Dept: OBSTETRICS AND GYNECOLOGY | Facility: HOSPITAL | Age: 29
End: 2024-06-26
Payer: COMMERCIAL

## 2024-06-26 ENCOUNTER — ANESTHESIA EVENT (OUTPATIENT)
Dept: OBSTETRICS AND GYNECOLOGY | Facility: HOSPITAL | Age: 29
End: 2024-06-26
Payer: COMMERCIAL

## 2024-06-26 DIAGNOSIS — R52 POSTPARTUM PAIN (HHS-HCC): Primary | ICD-10-CM

## 2024-06-26 PROBLEM — O42.90 PROM (PREMATURE RUPTURE OF MEMBRANES) (HHS-HCC): Status: ACTIVE | Noted: 2024-06-26

## 2024-06-26 LAB
ABO GROUP (TYPE) IN BLOOD: NORMAL
ANTIBODY SCREEN: NORMAL
BB ANTIBODY IDENTIFICATION: NORMAL
CASE #: NORMAL
ERYTHROCYTE [DISTWIDTH] IN BLOOD BY AUTOMATED COUNT: 13.6 % (ref 11.5–14.5)
HCT VFR BLD AUTO: 40.4 % (ref 36–46)
HGB BLD-MCNC: 13.7 G/DL (ref 12–16)
MCH RBC QN AUTO: 29.4 PG (ref 26–34)
MCHC RBC AUTO-ENTMCNC: 33.9 G/DL (ref 32–36)
MCV RBC AUTO: 87 FL (ref 80–100)
NRBC BLD-RTO: 0 /100 WBCS (ref 0–0)
PLATELET # BLD AUTO: 255 X10*3/UL (ref 150–450)
RBC # BLD AUTO: 4.66 X10*6/UL (ref 4–5.2)
RH FACTOR (ANTIGEN D): NORMAL
WBC # BLD AUTO: 11.8 X10*3/UL (ref 4.4–11.3)

## 2024-06-26 PROCEDURE — 86780 TREPONEMA PALLIDUM: CPT | Mod: STJLAB | Performed by: OBSTETRICS & GYNECOLOGY

## 2024-06-26 PROCEDURE — 88307 TISSUE EXAM BY PATHOLOGIST: CPT | Mod: TC,STJLAB | Performed by: OBSTETRICS & GYNECOLOGY

## 2024-06-26 PROCEDURE — 86901 BLOOD TYPING SEROLOGIC RH(D): CPT | Performed by: OBSTETRICS & GYNECOLOGY

## 2024-06-26 PROCEDURE — 85027 COMPLETE CBC AUTOMATED: CPT | Performed by: OBSTETRICS & GYNECOLOGY

## 2024-06-26 PROCEDURE — 2500000004 HC RX 250 GENERAL PHARMACY W/ HCPCS (ALT 636 FOR OP/ED): Performed by: OBSTETRICS & GYNECOLOGY

## 2024-06-26 PROCEDURE — 2500000004 HC RX 250 GENERAL PHARMACY W/ HCPCS (ALT 636 FOR OP/ED): Performed by: NURSE ANESTHETIST, CERTIFIED REGISTERED

## 2024-06-26 PROCEDURE — 59514 CESAREAN DELIVERY ONLY: CPT | Performed by: OBSTETRICS & GYNECOLOGY

## 2024-06-26 PROCEDURE — 59515 CESAREAN DELIVERY: CPT | Performed by: OBSTETRICS & GYNECOLOGY

## 2024-06-26 PROCEDURE — 2720000007 HC OR 272 NO HCPCS: Performed by: OBSTETRICS & GYNECOLOGY

## 2024-06-26 PROCEDURE — 36415 COLL VENOUS BLD VENIPUNCTURE: CPT | Performed by: OBSTETRICS & GYNECOLOGY

## 2024-06-26 PROCEDURE — 3700000014 HC AN EPIDURAL BLOCK CHARGE: Performed by: OBSTETRICS & GYNECOLOGY

## 2024-06-26 PROCEDURE — 7100000016 HC LABOR RECOVERY PER HOUR: Performed by: OBSTETRICS & GYNECOLOGY

## 2024-06-26 PROCEDURE — 59050 FETAL MONITOR W/REPORT: CPT

## 2024-06-26 PROCEDURE — 86870 RBC ANTIBODY IDENTIFICATION: CPT

## 2024-06-26 PROCEDURE — 2500000001 HC RX 250 WO HCPCS SELF ADMINISTERED DRUGS (ALT 637 FOR MEDICARE OP): Performed by: NURSE ANESTHETIST, CERTIFIED REGISTERED

## 2024-06-26 PROCEDURE — 7210000002 HC LABOR PER HOUR

## 2024-06-26 PROCEDURE — 1120000001 HC OB PRIVATE ROOM DAILY

## 2024-06-26 RX ORDER — MISOPROSTOL 200 UG/1
800 TABLET ORAL ONCE AS NEEDED
Status: DISCONTINUED | OUTPATIENT
Start: 2024-06-26 | End: 2024-06-29 | Stop reason: HOSPADM

## 2024-06-26 RX ORDER — MISOPROSTOL 200 UG/1
800 TABLET ORAL ONCE AS NEEDED
Status: DISCONTINUED | OUTPATIENT
Start: 2024-06-26 | End: 2024-06-26

## 2024-06-26 RX ORDER — LIDOCAINE 560 MG/1
1 PATCH PERCUTANEOUS; TOPICAL; TRANSDERMAL
Status: DISCONTINUED | OUTPATIENT
Start: 2024-06-26 | End: 2024-06-29 | Stop reason: HOSPADM

## 2024-06-26 RX ORDER — POLYETHYLENE GLYCOL 3350 17 G/17G
17 POWDER, FOR SOLUTION ORAL 2 TIMES DAILY PRN
Status: DISCONTINUED | OUTPATIENT
Start: 2024-06-26 | End: 2024-06-29 | Stop reason: HOSPADM

## 2024-06-26 RX ORDER — CARBOPROST TROMETHAMINE 250 UG/ML
250 INJECTION, SOLUTION INTRAMUSCULAR ONCE AS NEEDED
Status: DISCONTINUED | OUTPATIENT
Start: 2024-06-26 | End: 2024-06-29 | Stop reason: HOSPADM

## 2024-06-26 RX ORDER — OXYTOCIN 10 [USP'U]/ML
10 INJECTION, SOLUTION INTRAMUSCULAR; INTRAVENOUS ONCE AS NEEDED
Status: DISCONTINUED | OUTPATIENT
Start: 2024-06-26 | End: 2024-06-26

## 2024-06-26 RX ORDER — BUPIVACAINE HYDROCHLORIDE 7.5 MG/ML
INJECTION, SOLUTION INTRASPINAL AS NEEDED
Status: DISCONTINUED | OUTPATIENT
Start: 2024-06-26 | End: 2024-06-26

## 2024-06-26 RX ORDER — DIPHENHYDRAMINE HCL 25 MG
25 TABLET ORAL EVERY 4 HOURS PRN
Status: DISCONTINUED | OUTPATIENT
Start: 2024-06-26 | End: 2024-06-29 | Stop reason: HOSPADM

## 2024-06-26 RX ORDER — OXYCODONE HYDROCHLORIDE 10 MG/1
10 TABLET ORAL EVERY 4 HOURS PRN
Status: DISCONTINUED | OUTPATIENT
Start: 2024-06-27 | End: 2024-06-29 | Stop reason: HOSPADM

## 2024-06-26 RX ORDER — KETOROLAC TROMETHAMINE 30 MG/ML
INJECTION, SOLUTION INTRAMUSCULAR; INTRAVENOUS AS NEEDED
Status: DISCONTINUED | OUTPATIENT
Start: 2024-06-26 | End: 2024-06-26

## 2024-06-26 RX ORDER — ACETAMINOPHEN 120 MG/1
SUPPOSITORY RECTAL AS NEEDED
Status: DISCONTINUED | OUTPATIENT
Start: 2024-06-26 | End: 2024-06-26

## 2024-06-26 RX ORDER — ONDANSETRON HYDROCHLORIDE 2 MG/ML
4 INJECTION, SOLUTION INTRAVENOUS EVERY 6 HOURS PRN
Status: DISCONTINUED | OUTPATIENT
Start: 2024-06-26 | End: 2024-06-26

## 2024-06-26 RX ORDER — OXYCODONE HYDROCHLORIDE 5 MG/1
5 TABLET ORAL EVERY 4 HOURS PRN
Status: DISCONTINUED | OUTPATIENT
Start: 2024-06-27 | End: 2024-06-29 | Stop reason: HOSPADM

## 2024-06-26 RX ORDER — SIMETHICONE 80 MG
80 TABLET,CHEWABLE ORAL 4 TIMES DAILY PRN
Status: DISCONTINUED | OUTPATIENT
Start: 2024-06-26 | End: 2024-06-29 | Stop reason: HOSPADM

## 2024-06-26 RX ORDER — OXYTOCIN 10 [USP'U]/ML
10 INJECTION, SOLUTION INTRAMUSCULAR; INTRAVENOUS ONCE AS NEEDED
Status: DISCONTINUED | OUTPATIENT
Start: 2024-06-26 | End: 2024-06-29 | Stop reason: HOSPADM

## 2024-06-26 RX ORDER — TRANEXAMIC ACID 100 MG/ML
1000 INJECTION, SOLUTION INTRAVENOUS ONCE AS NEEDED
Status: DISCONTINUED | OUTPATIENT
Start: 2024-06-26 | End: 2024-06-26

## 2024-06-26 RX ORDER — BISACODYL 10 MG/1
10 SUPPOSITORY RECTAL DAILY PRN
Status: DISCONTINUED | OUTPATIENT
Start: 2024-06-26 | End: 2024-06-29 | Stop reason: HOSPADM

## 2024-06-26 RX ORDER — METOCLOPRAMIDE HYDROCHLORIDE 5 MG/ML
10 INJECTION INTRAMUSCULAR; INTRAVENOUS EVERY 6 HOURS PRN
Status: DISCONTINUED | OUTPATIENT
Start: 2024-06-26 | End: 2024-06-26

## 2024-06-26 RX ORDER — OXYTOCIN/0.9 % SODIUM CHLORIDE 30/500 ML
60 PLASTIC BAG, INJECTION (ML) INTRAVENOUS ONCE AS NEEDED
Status: DISCONTINUED | OUTPATIENT
Start: 2024-06-26 | End: 2024-06-29 | Stop reason: HOSPADM

## 2024-06-26 RX ORDER — LABETALOL HYDROCHLORIDE 5 MG/ML
20 INJECTION, SOLUTION INTRAVENOUS ONCE AS NEEDED
Status: DISCONTINUED | OUTPATIENT
Start: 2024-06-26 | End: 2024-06-29 | Stop reason: HOSPADM

## 2024-06-26 RX ORDER — METHYLERGONOVINE MALEATE 0.2 MG/ML
0.2 INJECTION INTRAVENOUS ONCE AS NEEDED
Status: DISCONTINUED | OUTPATIENT
Start: 2024-06-26 | End: 2024-06-29 | Stop reason: HOSPADM

## 2024-06-26 RX ORDER — CEFAZOLIN SODIUM 2 G/100ML
2 INJECTION, SOLUTION INTRAVENOUS ONCE
Status: COMPLETED | OUTPATIENT
Start: 2024-06-26 | End: 2024-06-26

## 2024-06-26 RX ORDER — HYDRALAZINE HYDROCHLORIDE 20 MG/ML
5 INJECTION INTRAMUSCULAR; INTRAVENOUS ONCE AS NEEDED
Status: DISCONTINUED | OUTPATIENT
Start: 2024-06-26 | End: 2024-06-29 | Stop reason: HOSPADM

## 2024-06-26 RX ORDER — FAMOTIDINE 10 MG/ML
INJECTION INTRAVENOUS AS NEEDED
Status: DISCONTINUED | OUTPATIENT
Start: 2024-06-26 | End: 2024-06-26

## 2024-06-26 RX ORDER — TRANEXAMIC ACID 100 MG/ML
1000 INJECTION, SOLUTION INTRAVENOUS ONCE AS NEEDED
Status: DISCONTINUED | OUTPATIENT
Start: 2024-06-26 | End: 2024-06-29 | Stop reason: HOSPADM

## 2024-06-26 RX ORDER — LOPERAMIDE HYDROCHLORIDE 2 MG/1
4 CAPSULE ORAL EVERY 2 HOUR PRN
Status: DISCONTINUED | OUTPATIENT
Start: 2024-06-26 | End: 2024-06-29 | Stop reason: HOSPADM

## 2024-06-26 RX ORDER — DIPHENHYDRAMINE HYDROCHLORIDE 50 MG/ML
25 INJECTION INTRAMUSCULAR; INTRAVENOUS EVERY 4 HOURS PRN
Status: DISCONTINUED | OUTPATIENT
Start: 2024-06-26 | End: 2024-06-29 | Stop reason: HOSPADM

## 2024-06-26 RX ORDER — TERBUTALINE SULFATE 1 MG/ML
0.25 INJECTION SUBCUTANEOUS ONCE AS NEEDED
Status: DISCONTINUED | OUTPATIENT
Start: 2024-06-26 | End: 2024-06-26

## 2024-06-26 RX ORDER — KETOROLAC TROMETHAMINE 30 MG/ML
30 INJECTION, SOLUTION INTRAMUSCULAR; INTRAVENOUS EVERY 6 HOURS
Status: COMPLETED | OUTPATIENT
Start: 2024-06-27 | End: 2024-06-27

## 2024-06-26 RX ORDER — NIFEDIPINE 10 MG/1
10 CAPSULE ORAL ONCE AS NEEDED
Status: DISCONTINUED | OUTPATIENT
Start: 2024-06-26 | End: 2024-06-29 | Stop reason: HOSPADM

## 2024-06-26 RX ORDER — METOCLOPRAMIDE 10 MG/1
10 TABLET ORAL EVERY 6 HOURS PRN
Status: DISCONTINUED | OUTPATIENT
Start: 2024-06-26 | End: 2024-06-26

## 2024-06-26 RX ORDER — HYDROMORPHONE HYDROCHLORIDE 1 MG/ML
0.2 INJECTION, SOLUTION INTRAMUSCULAR; INTRAVENOUS; SUBCUTANEOUS EVERY 5 MIN PRN
Status: DISCONTINUED | OUTPATIENT
Start: 2024-06-26 | End: 2024-06-29 | Stop reason: HOSPADM

## 2024-06-26 RX ORDER — NIFEDIPINE 10 MG/1
10 CAPSULE ORAL ONCE AS NEEDED
Status: DISCONTINUED | OUTPATIENT
Start: 2024-06-26 | End: 2024-06-26

## 2024-06-26 RX ORDER — PHENYLEPHRINE 10 MG/250 ML(40 MCG/ML)IN 0.9 % SOD.CHLORIDE INTRAVENOUS
CONTINUOUS PRN
Status: DISCONTINUED | OUTPATIENT
Start: 2024-06-26 | End: 2024-06-26

## 2024-06-26 RX ORDER — LOPERAMIDE HYDROCHLORIDE 2 MG/1
4 CAPSULE ORAL EVERY 2 HOUR PRN
Status: DISCONTINUED | OUTPATIENT
Start: 2024-06-26 | End: 2024-06-26

## 2024-06-26 RX ORDER — PHENYLEPHRINE HCL IN 0.9% NACL 1 MG/10 ML
SYRINGE (ML) INTRAVENOUS AS NEEDED
Status: DISCONTINUED | OUTPATIENT
Start: 2024-06-26 | End: 2024-06-26

## 2024-06-26 RX ORDER — NALOXONE HYDROCHLORIDE 0.4 MG/ML
0.1 INJECTION, SOLUTION INTRAMUSCULAR; INTRAVENOUS; SUBCUTANEOUS EVERY 5 MIN PRN
Status: DISCONTINUED | OUTPATIENT
Start: 2024-06-26 | End: 2024-06-29 | Stop reason: HOSPADM

## 2024-06-26 RX ORDER — ENOXAPARIN SODIUM 100 MG/ML
40 INJECTION SUBCUTANEOUS EVERY 24 HOURS
Status: DISCONTINUED | OUTPATIENT
Start: 2024-06-27 | End: 2024-06-29 | Stop reason: HOSPADM

## 2024-06-26 RX ORDER — OXYTOCIN/0.9 % SODIUM CHLORIDE 30/500 ML
60 PLASTIC BAG, INJECTION (ML) INTRAVENOUS ONCE AS NEEDED
Status: DISCONTINUED | OUTPATIENT
Start: 2024-06-26 | End: 2024-06-26

## 2024-06-26 RX ORDER — HYDRALAZINE HYDROCHLORIDE 20 MG/ML
5 INJECTION INTRAMUSCULAR; INTRAVENOUS ONCE AS NEEDED
Status: DISCONTINUED | OUTPATIENT
Start: 2024-06-26 | End: 2024-06-26

## 2024-06-26 RX ORDER — CARBOPROST TROMETHAMINE 250 UG/ML
250 INJECTION, SOLUTION INTRAMUSCULAR ONCE AS NEEDED
Status: DISCONTINUED | OUTPATIENT
Start: 2024-06-26 | End: 2024-06-26

## 2024-06-26 RX ORDER — ACETAMINOPHEN 325 MG/1
975 TABLET ORAL EVERY 6 HOURS
Status: DISCONTINUED | OUTPATIENT
Start: 2024-06-27 | End: 2024-06-29 | Stop reason: HOSPADM

## 2024-06-26 RX ORDER — HYDROMORPHONE HYDROCHLORIDE 1 MG/ML
0.2 INJECTION, SOLUTION INTRAMUSCULAR; INTRAVENOUS; SUBCUTANEOUS EVERY 5 MIN PRN
Status: DISCONTINUED | OUTPATIENT
Start: 2024-06-26 | End: 2024-06-27 | Stop reason: HOSPADM

## 2024-06-26 RX ORDER — ONDANSETRON 4 MG/1
4 TABLET, FILM COATED ORAL EVERY 6 HOURS PRN
Status: DISCONTINUED | OUTPATIENT
Start: 2024-06-26 | End: 2024-06-26

## 2024-06-26 RX ORDER — LABETALOL HYDROCHLORIDE 5 MG/ML
20 INJECTION, SOLUTION INTRAVENOUS ONCE AS NEEDED
Status: DISCONTINUED | OUTPATIENT
Start: 2024-06-26 | End: 2024-06-26

## 2024-06-26 RX ORDER — ADHESIVE BANDAGE
30 BANDAGE TOPICAL
Status: DISCONTINUED | OUTPATIENT
Start: 2024-06-26 | End: 2024-06-29 | Stop reason: HOSPADM

## 2024-06-26 RX ORDER — LIDOCAINE HYDROCHLORIDE 10 MG/ML
30 INJECTION INFILTRATION; PERINEURAL ONCE AS NEEDED
Status: DISCONTINUED | OUTPATIENT
Start: 2024-06-26 | End: 2024-06-26

## 2024-06-26 RX ORDER — MORPHINE SULFATE 1 MG/ML
INJECTION, SOLUTION EPIDURAL; INTRATHECAL; INTRAVENOUS AS NEEDED
Status: DISCONTINUED | OUTPATIENT
Start: 2024-06-26 | End: 2024-06-26

## 2024-06-26 RX ORDER — IBUPROFEN 600 MG/1
600 TABLET ORAL EVERY 6 HOURS
Status: DISCONTINUED | OUTPATIENT
Start: 2024-06-27 | End: 2024-06-29 | Stop reason: HOSPADM

## 2024-06-26 RX ORDER — ONDANSETRON 4 MG/1
4 TABLET, FILM COATED ORAL EVERY 6 HOURS PRN
Status: DISCONTINUED | OUTPATIENT
Start: 2024-06-26 | End: 2024-06-29 | Stop reason: HOSPADM

## 2024-06-26 RX ORDER — SODIUM CHLORIDE, SODIUM LACTATE, POTASSIUM CHLORIDE, CALCIUM CHLORIDE 600; 310; 30; 20 MG/100ML; MG/100ML; MG/100ML; MG/100ML
125 INJECTION, SOLUTION INTRAVENOUS CONTINUOUS
Status: DISCONTINUED | OUTPATIENT
Start: 2024-06-26 | End: 2024-06-26

## 2024-06-26 RX ORDER — ONDANSETRON HYDROCHLORIDE 2 MG/ML
4 INJECTION, SOLUTION INTRAVENOUS EVERY 6 HOURS PRN
Status: DISCONTINUED | OUTPATIENT
Start: 2024-06-26 | End: 2024-06-29 | Stop reason: HOSPADM

## 2024-06-26 RX ORDER — METHYLERGONOVINE MALEATE 0.2 MG/ML
0.2 INJECTION INTRAVENOUS ONCE AS NEEDED
Status: DISCONTINUED | OUTPATIENT
Start: 2024-06-26 | End: 2024-06-26

## 2024-06-26 SDOH — HEALTH STABILITY: MENTAL HEALTH
HOW OFTEN DO YOU NEED TO HAVE SOMEONE HELP YOU WHEN YOU READ INSTRUCTIONS, PAMPHLETS, OR OTHER WRITTEN MATERIAL FROM YOUR DOCTOR OR PHARMACY?: NEVER

## 2024-06-26 SDOH — SOCIAL STABILITY: SOCIAL NETWORK
IN A TYPICAL WEEK, HOW MANY TIMES DO YOU TALK ON THE PHONE WITH FAMILY, FRIENDS, OR NEIGHBORS?: MORE THAN THREE TIMES A WEEK

## 2024-06-26 SDOH — ECONOMIC STABILITY: FOOD INSECURITY: WITHIN THE PAST 12 MONTHS, THE FOOD YOU BOUGHT JUST DIDN'T LAST AND YOU DIDN'T HAVE MONEY TO GET MORE.: NEVER TRUE

## 2024-06-26 SDOH — HEALTH STABILITY: MENTAL HEALTH: HOW OFTEN DO YOU HAVE A DRINK CONTAINING ALCOHOL?: NEVER

## 2024-06-26 SDOH — HEALTH STABILITY: MENTAL HEALTH: WISH TO BE DEAD (PAST 1 MONTH): NO

## 2024-06-26 SDOH — SOCIAL STABILITY: SOCIAL NETWORK
DO YOU BELONG TO ANY CLUBS OR ORGANIZATIONS SUCH AS CHURCH GROUPS UNIONS, FRATERNAL OR ATHLETIC GROUPS, OR SCHOOL GROUPS?: NO

## 2024-06-26 SDOH — HEALTH STABILITY: MENTAL HEALTH: HOW MANY STANDARD DRINKS CONTAINING ALCOHOL DO YOU HAVE ON A TYPICAL DAY?: PATIENT DOES NOT DRINK

## 2024-06-26 SDOH — SOCIAL STABILITY: SOCIAL INSECURITY
WITHIN THE LAST YEAR, HAVE YOU BEEN KICKED, HIT, SLAPPED, OR OTHERWISE PHYSICALLY HURT BY YOUR PARTNER OR EX-PARTNER?: NO

## 2024-06-26 SDOH — ECONOMIC STABILITY: FOOD INSECURITY: WITHIN THE PAST 12 MONTHS, YOU WORRIED THAT YOUR FOOD WOULD RUN OUT BEFORE YOU GOT MONEY TO BUY MORE.: NEVER TRUE

## 2024-06-26 SDOH — ECONOMIC STABILITY: TRANSPORTATION INSECURITY
IN THE PAST 12 MONTHS, HAS THE LACK OF TRANSPORTATION KEPT YOU FROM MEDICAL APPOINTMENTS OR FROM GETTING MEDICATIONS?: NO

## 2024-06-26 SDOH — SOCIAL STABILITY: SOCIAL INSECURITY
WITHIN THE LAST YEAR, HAVE TO BEEN RAPED OR FORCED TO HAVE ANY KIND OF SEXUAL ACTIVITY BY YOUR PARTNER OR EX-PARTNER?: NO

## 2024-06-26 SDOH — SOCIAL STABILITY: SOCIAL INSECURITY: ARE THERE ANY APPARENT SIGNS OF INJURIES/BEHAVIORS THAT COULD BE RELATED TO ABUSE/NEGLECT?: NO

## 2024-06-26 SDOH — SOCIAL STABILITY: SOCIAL NETWORK: HOW OFTEN DO YOU ATTENT MEETINGS OF THE CLUB OR ORGANIZATION YOU BELONG TO?: NEVER

## 2024-06-26 SDOH — SOCIAL STABILITY: SOCIAL NETWORK: HOW OFTEN DO YOU ATTEND CHURCH OR RELIGIOUS SERVICES?: NEVER

## 2024-06-26 SDOH — HEALTH STABILITY: MENTAL HEALTH
STRESS IS WHEN SOMEONE FEELS TENSE, NERVOUS, ANXIOUS, OR CAN'T SLEEP AT NIGHT BECAUSE THEIR MIND IS TROUBLED. HOW STRESSED ARE YOU?: NOT AT ALL

## 2024-06-26 SDOH — HEALTH STABILITY: MENTAL HEALTH: CURRENT SMOKER: 0

## 2024-06-26 SDOH — HEALTH STABILITY: MENTAL HEALTH: HOW OFTEN DO YOU HAVE 6 OR MORE DRINKS ON ONE OCCASION?: NEVER

## 2024-06-26 SDOH — HEALTH STABILITY: PHYSICAL HEALTH: ON AVERAGE, HOW MANY DAYS PER WEEK DO YOU ENGAGE IN MODERATE TO STRENUOUS EXERCISE (LIKE A BRISK WALK)?: 3 DAYS

## 2024-06-26 SDOH — HEALTH STABILITY: MENTAL HEALTH: HAVE YOU USED ANY PRESCRIPTION DRUGS OTHER THAN PRESCRIBED IN THE PAST 12 MONTHS?: NO

## 2024-06-26 SDOH — SOCIAL STABILITY: SOCIAL NETWORK: ARE YOU MARRIED, WIDOWED, DIVORCED, SEPARATED, NEVER MARRIED, OR LIVING WITH A PARTNER?: MARRIED

## 2024-06-26 SDOH — ECONOMIC STABILITY: INCOME INSECURITY: HOW HARD IS IT FOR YOU TO PAY FOR THE VERY BASICS LIKE FOOD, HOUSING, MEDICAL CARE, AND HEATING?: NOT HARD AT ALL

## 2024-06-26 SDOH — HEALTH STABILITY: MENTAL HEALTH: SUICIDAL BEHAVIOR (LIFETIME): NO

## 2024-06-26 SDOH — SOCIAL STABILITY: SOCIAL INSECURITY: HAS ANYONE EVER THREATENED TO HURT YOUR FAMILY OR YOUR PETS?: NO

## 2024-06-26 SDOH — SOCIAL STABILITY: SOCIAL INSECURITY: HAVE YOU HAD ANY THOUGHTS OF HARMING ANYONE ELSE?: NO

## 2024-06-26 SDOH — HEALTH STABILITY: MENTAL HEALTH: NON-SPECIFIC ACTIVE SUICIDAL THOUGHTS (PAST 1 MONTH): NO

## 2024-06-26 SDOH — ECONOMIC STABILITY: TRANSPORTATION INSECURITY
IN THE PAST 12 MONTHS, HAS LACK OF TRANSPORTATION KEPT YOU FROM MEETINGS, WORK, OR FROM GETTING THINGS NEEDED FOR DAILY LIVING?: NO

## 2024-06-26 SDOH — SOCIAL STABILITY: SOCIAL INSECURITY: WITHIN THE LAST YEAR, HAVE YOU BEEN HUMILIATED OR EMOTIONALLY ABUSED IN OTHER WAYS BY YOUR PARTNER OR EX-PARTNER?: NO

## 2024-06-26 SDOH — SOCIAL STABILITY: SOCIAL INSECURITY: WITHIN THE LAST YEAR, HAVE YOU BEEN AFRAID OF YOUR PARTNER OR EX-PARTNER?: NO

## 2024-06-26 SDOH — ECONOMIC STABILITY: HOUSING INSECURITY: DO YOU FEEL UNSAFE GOING BACK TO THE PLACE WHERE YOU ARE LIVING?: NO

## 2024-06-26 SDOH — SOCIAL STABILITY: SOCIAL NETWORK: HOW OFTEN DO YOU GET TOGETHER WITH FRIENDS OR RELATIVES?: MORE THAN THREE TIMES A WEEK

## 2024-06-26 SDOH — SOCIAL STABILITY: SOCIAL INSECURITY: ARE YOU OR HAVE YOU BEEN THREATENED OR ABUSED PHYSICALLY, EMOTIONALLY, OR SEXUALLY BY ANYONE?: NO

## 2024-06-26 SDOH — HEALTH STABILITY: MENTAL HEALTH: WERE YOU ABLE TO COMPLETE ALL THE BEHAVIORAL HEALTH SCREENINGS?: YES

## 2024-06-26 SDOH — SOCIAL STABILITY: SOCIAL INSECURITY: DO YOU FEEL ANYONE HAS EXPLOITED OR TAKEN ADVANTAGE OF YOU FINANCIALLY OR OF YOUR PERSONAL PROPERTY?: NO

## 2024-06-26 SDOH — SOCIAL STABILITY: SOCIAL INSECURITY: VERBAL ABUSE: DENIES

## 2024-06-26 SDOH — SOCIAL STABILITY: SOCIAL INSECURITY: DOES ANYONE TRY TO KEEP YOU FROM HAVING/CONTACTING OTHER FRIENDS OR DOING THINGS OUTSIDE YOUR HOME?: NO

## 2024-06-26 SDOH — HEALTH STABILITY: MENTAL HEALTH: HAVE YOU USED ANY SUBSTANCES (CANABIS, COCAINE, HEROIN, HALLUCINOGENS, INHALANTS, ETC.) IN THE PAST 12 MONTHS?: NO

## 2024-06-26 SDOH — SOCIAL STABILITY: SOCIAL INSECURITY: HAVE YOU HAD THOUGHTS OF HARMING ANYONE ELSE?: NO

## 2024-06-26 SDOH — SOCIAL STABILITY: SOCIAL INSECURITY: PHYSICAL ABUSE: DENIES

## 2024-06-26 SDOH — HEALTH STABILITY: PHYSICAL HEALTH: ON AVERAGE, HOW MANY MINUTES DO YOU ENGAGE IN EXERCISE AT THIS LEVEL?: 30 MIN

## 2024-06-26 SDOH — SOCIAL STABILITY: SOCIAL INSECURITY: ABUSE SCREEN: ADULT

## 2024-06-26 ASSESSMENT — LIFESTYLE VARIABLES
AUDIT-C TOTAL SCORE: 0
AUDIT-C TOTAL SCORE: 0
SKIP TO QUESTIONS 9-10: 1
HOW OFTEN DO YOU HAVE A DRINK CONTAINING ALCOHOL: NEVER
AUDIT-C TOTAL SCORE: 0
HOW OFTEN DO YOU HAVE 6 OR MORE DRINKS ON ONE OCCASION: NEVER
SKIP TO QUESTIONS 9-10: 1
AUDIT-C TOTAL SCORE: 0
SKIP TO QUESTIONS 9-10: 1
HOW MANY STANDARD DRINKS CONTAINING ALCOHOL DO YOU HAVE ON A TYPICAL DAY: PATIENT DOES NOT DRINK

## 2024-06-26 ASSESSMENT — ACTIVITIES OF DAILY LIVING (ADL)
JUDGMENT_ADEQUATE_SAFELY_COMPLETE_DAILY_ACTIVITIES: YES
DRESSING YOURSELF: INDEPENDENT
HEARING - RIGHT EAR: FUNCTIONAL
FEEDING YOURSELF: INDEPENDENT
TOILETING: INDEPENDENT
WALKS IN HOME: INDEPENDENT
BATHING: INDEPENDENT
PATIENT'S MEMORY ADEQUATE TO SAFELY COMPLETE DAILY ACTIVITIES?: YES
HEARING - LEFT EAR: FUNCTIONAL
ADEQUATE_TO_COMPLETE_ADL: YES
GROOMING: INDEPENDENT
LACK_OF_TRANSPORTATION: NO

## 2024-06-26 ASSESSMENT — PATIENT HEALTH QUESTIONNAIRE - PHQ9
1. LITTLE INTEREST OR PLEASURE IN DOING THINGS: NOT AT ALL
SUM OF ALL RESPONSES TO PHQ9 QUESTIONS 1 & 2: 0
2. FEELING DOWN, DEPRESSED OR HOPELESS: NOT AT ALL

## 2024-06-26 ASSESSMENT — PAIN SCALES - GENERAL
PAINLEVEL_OUTOF10: 0 - NO PAIN

## 2024-06-26 NOTE — CARE PLAN
The patient's goals for the shift include meet baby    The clinical goals for the shift include safe delivery

## 2024-06-26 NOTE — TELEPHONE ENCOUNTER
37.3 wk ob just called ob line to let us know her water just broke and she is on her way to [facility].  She states she has had several small gushes of fluid come out of her.  + FM but less than it had been.  Denies contractions or cramping but has been feeling more pressure.  Patient is scheduled for c/s on 7/5. Per patient, she last ate food at 9 am, only had a few sips of water today. + FM  Patient states she paged on call provider as well but hasn't heard back yet.    Message sent to Dr Helton to make her aware

## 2024-06-26 NOTE — NURSING NOTE
[facility], pt has decided they would prefer to delivery by c/s here at [facility]. Staff prepared, consent received.

## 2024-06-26 NOTE — H&P
Obstetrical Admission History and Physical  Chief Complaint:    Snehal Ball is a 29 y.o. . 37w 3d w PROM at 14:00 today   Gushing clear fluid. Known breech and FGR 12 % AC 9 % EFW 2108g at 34.4 wk US    Assessment/Plan    Breech on bedside scan . FGR AC 9%  37w3d PROM confirmed w exam. Clear fluid. FHR Cat 1 . No ctxs     I discussed with the patient Dx of  fetal growth restriction - there is a possibility that the baby would need transferred for NICU care at [facility].  Patient understands this possibility and is requesting to deliver downtown so she could avoid separation from her baby.  The transfer center was called to arrange transfer.  Aware of Primary csection needed for breech and agrees.   18:00   Update: FHR cat 1 , no decels. No ctxs. No labor. Still leaking clear fluid.   Afebrile VSS.   No transfer team available at this time. Discussed again option for delivery here with peds available if needed for her baby and pt prefers transport to [facility].   However if no transfer by 730 pm pt may agree to csection delivery here   Will follow   18:40   Pt decided she wants to cancel the transfer and be delivered here. OR team notified     Elis Helton MD      Principal Problem:    PROM (premature rupture of membranes) ([facility])    FGR 12 % , AC 9 %       Pregnancy Problems (from 23 to present)       Problem Noted Resolved    PROM (premature rupture of membranes) ([facility]) 2024 by Elis Helton MD No    Priority:  Medium      Breech presentation ([facility]) 6/10/2024 by Libia Negrete MD No    Priority:  Medium      Overview Addendum 2024  9:39 AM by Libia Negrete MD     - breech at 34.4 USN   - Primary c/s scheduled on  at 38.5          Fetal growth restriction antepartum ([facility]) 2024 by Libia Negrete MD No    Priority:  Medium      Overview Addendum 6/10/2024  8:06 AM by Libia Negrete MD     - EFW 12% at 34.4 with AC 9%  <>  "Continue weekly BPP/dopplers          Rh negative status during pregnancy in third trimester ([facility]) 2024 by Libia Negrete MD No    Priority:  Medium      Overview Signed 2024  3:06 PM by Libia Negrete MD     - s/p Rhogam           Encounter for supervision of normal first pregnancy in first trimester ([facility]) 2023 by Libia Negrete MD No    Priority:  Medium      Overview Addendum 2024 10:52 AM by Libia Negrete MD     - s/p flu vaccine  - Updated COVID vaccine recommended  - Normal 13 week USN    - Risk reducing NIPS   - It's a GIRL - \"Cassi\"  - Has breast pump   - s/p Tdap                Subjective   Snehal is here complaining of SROM at 2p.m. today of clear fluid. Good fetal movement. Denies vaginal bleeding., Denies contractions.         Obstetrical History   OB History    Para Term  AB Living   1 0 0 0 0     SAB IAB Ectopic Multiple Live Births   0 0 0          # Outcome Date GA Lbr Ángel/2nd Weight Sex Delivery Anes PTL Lv   1 Current                Past Medical History  Past Medical History:   Diagnosis Date    Abnormal Pap smear of cervix         Past Surgical History   Past Surgical History:   Procedure Laterality Date    OTHER SURGICAL HISTORY  2020    No history of surgery       Social History  Social History     Tobacco Use    Smoking status: Never    Smokeless tobacco: Never   Substance Use Topics    Alcohol use: Not Currently     Substance and Sexual Activity   Drug Use Never       Allergies  Patient has no known allergies.     Medications  Medications Prior to Admission   Medication Sig Dispense Refill Last Dose    PNV 12-iron-methylfolate-dha 29 mg iron-1 mg -350 mg comb pack,tablet DR,capsule DR Take 1 tablet by mouth once daily.   2024 at 0900       Objective    Last Vitals  Temp Pulse Resp BP MAP O2 Sat   36.7 °C (98.1 °F) 71 17 138/75   96 %     Physical Examination  GENERAL: Examination reveals a well developed, " well nourished, gravid female in no acute distress. She is alert and cooperative.  ABDOMEN: soft, gravid, nontender, nondistended, no abnormal masses, no epigastric pain  FHR is 150s  , with Accelerations, and a Category I tracing.    PSYCHOLOGICAL: awake and alert; oriented to person, place, and time    Lab Review  Labs in chart were reviewed.

## 2024-06-26 NOTE — ANESTHESIA PREPROCEDURE EVALUATION
Patient: Snehal Ball    Evaluation Method: In-person visit    Procedure Information    Date: 06/26/24  Procedure: Procedure Not Yet Scheduled         Relevant Problems   GYN   (+) Encounter for supervision of normal first pregnancy in first trimester ([facility])       Clinical information reviewed:   Tobacco  Allergies  Meds   Med Hx  Surg Hx   Fam Hx          NPO Detail:  NPO/Void Status  Date of Last Liquid: 06/26/24  Time of Last Liquid: 1400  Date of Last Solid: 06/26/24  Time of Last Solid: 0930         OB/Gyn Evaluation    Present Pregnancy    Patient is pregnant now.  (+) , malpresentation   Obstetric History            Physical Exam    Airway  Mallampati: II  TM distance: >3 FB  Neck ROM: full     Cardiovascular - normal exam  Rhythm: regular  Rate: normal     Dental - normal exam     Pulmonary - normal exam  Breath sounds clear to auscultation     Abdominal      Other findings: Gravid      Anesthesia Plan    History of general anesthesia?: no  History of complications of general anesthesia?: unknown/emergency    ASA 2     spinal     The patient is not a current smoker.    Anesthetic plan and risks discussed with patient.  Use of blood products discussed with patient who consented to blood products.

## 2024-06-26 NOTE — ANESTHESIA PROCEDURE NOTES
Spinal Block    Patient location during procedure: OB  Start time: 6/26/2024 7:40 PM  End time: 6/26/2024 7:44 PM  Reason for block: primary anesthetic  Staffing  Performed: CRNA   Authorized by: PIPER Mercedes    Performed by: PIPER Mercedes    Preanesthetic Checklist  Completed: patient identified, IV checked, site marked, risks and benefits discussed, surgical consent, monitors and equipment checked, pre-op evaluation, timeout performed and sterile techniques followed  Block Timeout  RN/Licensed healthcare professional reads aloud to the Anesthesia provider and entire team: Patient identity, procedure with side and site, patient position, and as applicable the availability of implants/special equipment/special requirements.  Patient on coagulant treatment: no  Timeout performed at: 6/26/2024 7:40 PM  Spinal Block  Patient position: sitting  Prep: ChloraPrep  Sterility prep: cap, gloves, drape, hand hygiene and mask  Sedation level: no sedation  Patient monitoring: blood pressure, continuous pulse oximetry and heart rate  Approach: midline  Injection technique: single-shot  Needle  Needle type: pencil-point   Needle gauge: 25 G  Needle length: 3.5 in  Free flowing CSF: yes    Assessment  Sensory level: T4  Block outcome: patient comfortable  Procedure assessment: patient tolerated procedure well with no immediate complications

## 2024-06-27 ENCOUNTER — APPOINTMENT (OUTPATIENT)
Dept: RADIOLOGY | Facility: CLINIC | Age: 29
End: 2024-06-27
Payer: COMMERCIAL

## 2024-06-27 LAB
ERYTHROCYTE [DISTWIDTH] IN BLOOD BY AUTOMATED COUNT: 13.3 % (ref 11.5–14.5)
HCT VFR BLD AUTO: 37 % (ref 36–46)
HGB BLD-MCNC: 12.8 G/DL (ref 12–16)
MCH RBC QN AUTO: 29.4 PG (ref 26–34)
MCHC RBC AUTO-ENTMCNC: 34.6 G/DL (ref 32–36)
MCV RBC AUTO: 85 FL (ref 80–100)
NRBC BLD-RTO: 0 /100 WBCS (ref 0–0)
PLATELET # BLD AUTO: 243 X10*3/UL (ref 150–450)
RBC # BLD AUTO: 4.35 X10*6/UL (ref 4–5.2)
TREPONEMA PALLIDUM IGG+IGM AB [PRESENCE] IN SERUM OR PLASMA BY IMMUNOASSAY: NONREACTIVE
WBC # BLD AUTO: 16.4 X10*3/UL (ref 4.4–11.3)

## 2024-06-27 PROCEDURE — 2500000004 HC RX 250 GENERAL PHARMACY W/ HCPCS (ALT 636 FOR OP/ED): Performed by: OBSTETRICS & GYNECOLOGY

## 2024-06-27 PROCEDURE — 36415 COLL VENOUS BLD VENIPUNCTURE: CPT | Performed by: OBSTETRICS & GYNECOLOGY

## 2024-06-27 PROCEDURE — 51702 INSERT TEMP BLADDER CATH: CPT

## 2024-06-27 PROCEDURE — 85027 COMPLETE CBC AUTOMATED: CPT | Performed by: OBSTETRICS & GYNECOLOGY

## 2024-06-27 PROCEDURE — 2500000001 HC RX 250 WO HCPCS SELF ADMINISTERED DRUGS (ALT 637 FOR MEDICARE OP): Performed by: OBSTETRICS & GYNECOLOGY

## 2024-06-27 PROCEDURE — 1220000001 HC OB SEMI-PRIVATE ROOM DAILY

## 2024-06-27 RX ORDER — DOCUSATE SODIUM 100 MG/1
100 CAPSULE, LIQUID FILLED ORAL 2 TIMES DAILY
Status: DISCONTINUED | OUTPATIENT
Start: 2024-06-27 | End: 2024-06-29 | Stop reason: HOSPADM

## 2024-06-27 ASSESSMENT — PAIN SCALES - GENERAL
PAINLEVEL_OUTOF10: 0 - NO PAIN
PAIN_LEVEL: 2
PAINLEVEL_OUTOF10: 0 - NO PAIN

## 2024-06-27 NOTE — L&D DELIVERY NOTE
CSECTION OP NOTE   Surgeons   Elis Helton MD - Primary    Resident/Fellow/Other Assistant:  Britany Quezada SA     Pre-operative diagnosis:    Principal Problem:  Breech , 37w3d , FGR   PROM (premature rupture of membranes) ([facility])  Procedure: PRIMARY LOW TRANSVERSE C SECTION   Pt was taken to the operating room.  Fetal heart tones were noted to be in the 150s  She was then placed in left lateral tilt position. A morton catheter was placed with iodine prep and the vaginal area was prepped with iodine also .  Sequential compression devices were applied to both legs.  Her abdomen was then prepped with Chloraprep and draped in standard sterile fashion.  A pre-procedure time out was performed.    After anesthesia was deemed adequate, a Pfannenstiel skin incision was made and was carried down sharply to the fascia.  The fascia was then nicked in the midline and the incision was extended bilaterally  using the Josue scissors.  The fascia was tented up and dissected from the underlying rectus muscles using both sharp and blunt dissection.  The rectus muscles were then divided in the midline and the peritoneum was isolated and entered bluntly.  The peritoneal incision was extended superiorly and inferiorly taking care to avoid underlying viscera.  A bladder flap was created  and the bladder blade was placed.    A low transverse uterine incision was made with the scalpel and extended with bandage scissors.  The amniotic fluid was clear .  This was a breech delivery.  The infants breech presented at the incision . The feet were identified and legs were flexed individually and brought out through the incision without difficulty.  The back was turned and the rest of the infant was delivered up to the level of the scapula.  Each arm was flexed and delivered through the incision easily.  A hand was placed gently protecting  the fetal face, the head was flexed and  fundal pressure was applied by the assistant to deliver the  head.  The baby gave a good cry at delivery. The umbilical cord was clamped and cut and the infant was handed off to the nurse. The infant's cord was clamped and cut and the  infant was handed off to awaiting personnel.  The placenta was then delivered intact using fundal massage.  The uterus was explored and emptied. It was determined to be intact with no extensions. The uterine incision was closed using a running interlocking stitch of 0-Vicryl  in a double layer.  Good hemostasis at the hysterotomy was then noted.  The fallopian tubes and ovaries bilaterally were grossly normal .  Hemostasis was again noted.  The gutters were cleared of all clots and debris.  The underside of the fascia and the rectus muscles were also found to be hemostatic.  The  peritoneum was closed with 2-0 vicryl using a ribbon retractor to protect the bowel. The fascia was then closed using a running stitch of 0-Vicryl .  The subcutaneous layer was then irrigated.   Hemostasis in the subcutaneous layer was assured .  The subcutaneous layer was reapproximated using a running stitch of 3-0 vicryl suture.  The skin was closed subcuticularly  with suture.   The patient was then cleaned and dried and a sterile dressing was placed.    All sponge, needle and instrument counts were correct at the end of the procedure.  The patient tolerated the procedure well.  She was taken out of the operating room with her baby and back to her labor and delivery room in stable and satisfactory condition.      Elis Helton MD

## 2024-06-27 NOTE — PROGRESS NOTES
Postpartum Progress Note    Assessment/Plan   Snehal Ball is a 29 y.o., , who delivered at 37w3d gestation    Now PPD#1 s/p , Low Transverse  on 2024     Post-operative  - Continue routine postpartum care  - Pain well controlled on po medications  - DVT risk score DVT Score: 5 , ppx with lovenox  - Hb   13.7  --> 12.8   ,  appropriate for blood loss  Results from last 7 days   Lab Units 24  0725 24  1614   WBC AUTO x10*3/uL 16.4* 11.8*   HEMOGLOBIN g/dL 12.8 13.7   PLATELETS AUTO x10*3/uL 243 255   MCV fL 85 87         Postpartum  Rh  positive  rubella immune  Breastfeeding, lactation consult            Dede Walker MD     Principal Problem:    PROM (premature rupture of membranes) ([facility])    Pregnancy Problems (from 23 to present)       Problem Noted Resolved    PROM (premature rupture of membranes) ([facility]) 2024 by Elis Helton MD No    Priority:  Medium      Breech presentation ([facility]) 6/10/2024 by Libia Negrete MD No    Priority:  Medium      Overview Addendum 2024  9:39 AM by Libia Negrete MD     - breech at 34.4 USN   - Primary c/s scheduled on  at 38.5          Fetal growth restriction antepartum ([facility]) 2024 by Libia Negrete MD No    Priority:  Medium      Overview Addendum 6/10/2024  8:06 AM by Libia Negrete MD     - EFW 12% at 34.4 with AC 9%  <> Continue weekly BPP/dopplers          Rh negative status during pregnancy in third trimester ([facility]) 2024 by Libia Negrete MD No    Priority:  Medium      Overview Signed 2024  3:06 PM by Libia Negrete MD     - s/p Rhogam           Encounter for supervision of normal first pregnancy in first trimester ([facility]) 2023 by Libia Negrete MD No    Priority:  Medium      Overview Addendum 2024 10:52 AM by Libia Negrete MD     - s/p flu vaccine  - Updated COVID vaccine recommended  - Normal 13 week USN  "   - Risk reducing NIPS   - It's a GIRL - \"Cassi\"  - Has breast pump   - s/p Tdap                Hospital course: no complications   section delivery  Patient is currently breastfeedingThe patient's blood type is O NEG. The baby's blood type is O NEG . Rhogam is not indicated.    Subjective      Snehal Ball is PPD#1 s/p  who reports feeling overall well.  No acute events overnight.  Voiding spontaneously, passing flatus.  Pain well controlled on PO meds.  Light lochia. Tolerating diet.     Objective   Allergies:   Patient has no known allergies.         Last Vitals:  Temp Pulse Resp BP MAP Pulse Ox   36.7 °C (98.1 °F) 68 16 110/65   98 %     Vitals Min/Max Last 24 Hours:  Temp  Min: 36.3 °C (97.3 °F)  Max: 37 °C (98.6 °F)  Pulse  Min: 50  Max: 72  Resp  Min: 16  Max: 20  BP  Min: 108/63  Max: 134/77    Intake/Output:     Intake/Output Summary (Last 24 hours) at 2024 190  Last data filed at 2024 1626  Gross per 24 hour   Intake --   Output 2975 ml   Net -2975 ml       Physical Exam:  General: examination reveals a well developed, well nourished, female, in no acute distress. She is alert and cooperative.  HEENT: external ears normal. Nose normal, no erythema or discharge.  Neck: supple, no significant adenopathy  Lungs: breathing even and unlabored, lungs clear all fields  Cardiac: warm and well perfused, heart rate regular rate and rhythm, no murmur  Abdominal: soft, fundus firm, below umbilicus, nontender  Incision: mepilex dressing intact  Extremities: no redness or tenderness in the calves or thighs, no edema.  Neurological: alert, oriented, normal speech, no focal findings or movement disorder noted.      Lab Data:  Lab Results   Component Value Date    WBC 16.4 (H) 2024    HGB 12.8 2024    HCT 37.0 2024     2024       "

## 2024-06-27 NOTE — CARE PLAN
The patient's goals for the shift include bond with baby    The clinical goals for the shift include remain hemodynamically stable    Over the shift, the patient did  make progress towards the following goals.

## 2024-06-27 NOTE — CARE PLAN
The patient's goals for the shift include bond with baby    The clinical goals for the shift include no signs symptoms of infection      Problem: Vaginal Birth or  Section  Goal: Fetal and maternal status remain reassuring during the birth process  Outcome: Met     Problem: Pain - Adult  Goal: Verbalizes/displays adequate comfort level or baseline comfort level  Outcome: Met     Problem: Safety - Adult  Goal: Free from fall injury  Outcome: Met     Problem: Postpartum  Goal: Experiences normal postpartum course  Outcome: Progressing

## 2024-06-27 NOTE — PROGRESS NOTES
Spiritual Care Visit    Clinical Encounter Type  Visited With: Patient and family together  Routine Visit: Introduction  Continue Visiting: No                                            Taxonomy  Intended Effects: Promote sense of peace, Helping someone feel comforted, Joya affirmation, Journeying with someone in the grief process, Lessen anxiety  Methods: Offer spiritual/Presybeterian support  Interventions: Provide a Presybeterian item(s), Share words of hope and inspiration    Patient shared she is Alevism.  Patient said this is her first baby.   gave her a baby Bible and coordinated a  visit at her request.

## 2024-06-27 NOTE — LACTATION NOTE
Lactation Consultant Note  Lactation Consultation  Reason for Consult: Initial assessment  Consultant Name: Malka Gilbert RN    Maternal Information  Has mother  before?: No  Infant to breast within first 2 hours of birth?: Yes  Exclusive Pump and Bottle Feed: No    Maternal Assessment  Breast Assessment: Medium, Symmetrical, Firm, Compressible, Breast changes observed in pregnancy (Patient states nipple darkened)  Nipple Assessment: Intact, Flat, Short (slightly erect with stimulation, still short)  Areola Assessment: Normal    Infant Assessment  Infant Behavior: Sleepy, Suckles on and off, needs stimulation  Infant Assessment: Other (Comment) (differed at this time attempting to latch nb last fed at 0630, 1045 when RN in room)    Feeding Assessment  Nutrition Source: Breastmilk  Feeding Method: Feeding expressed breastmilk, Spoon feeding  Feeding Position: Cradle, One sided nursing, Infant not tucked close and facing mother, Nose lightly touching breast  Suck/Feeding: Nipple shield used, Content after feeding, Sustained, Other (Comment) (Constant tactile stimulation to keep nb sucking)  Latch Assessment: Minimal assistance is needed, Latch achieved after repeated attempts, Chin and lower lip contact breast first, Upper lip turned in, Lower lip turned in, Other (Comment) (NB latch shallow at times, working with parents to obtain latch and hae active sucking mother)    LATCH TOOL  Latch: Repeated attempts, hold nipple in mouth, stimulate to suck  Audible Swallowing: A few with stimulation  Type of Nipple: Flat  Comfort (Breast/Nipple): Soft/non-tender  Hold (Positioning): Minimal assist, teach one side, mother does other, staff holds  LATCH Score: 6    Breast Pump  Pump: Hand expression  Frequency: 8-10 times per day  Duration: 15-20 minutes per session  Volume of Milk Production: 10  Units of Volume: mL per session (10ml over night, drops , mother HE after feeding)    Other OB Lactation  "Tools  Lactation Tools: Nipple shields (20mm)    Patient Follow-up  Inpatient Lactation Follow-up Needed : Yes  Outpatient Lactation Follow-up: Recommended    Other OB Lactation Documentation  Maternal Risk Factors: Age over 30, primiparity, Flat or inverted nipple tissue,  delivery, Other (comment) (IUGR, PROM, Breech, RH-)  Infant Risk Factors: Other (comment), Poor or painful latch / restricted feedings (IUGR, NB 37.3weeks, sleepy, BW 2830)    Recommendations/Summary  Rn in room at this time as mother is attempting to feed NB  Mother is 29yo  Primary  for Breech Presentation on 24 at 2002 of viable girl \"Mauve\" at 37.3 weeks gestation. Mother initially to deliver at [facility] due to IUGR.  Mother O-.   Overnight reports NB latched well after birth then sleepy, HE of 2ml via spoon and then latching with nipple shield and actively sucking at next feed. Last feed 630 , mother reports did not go well. Staff instructed mother to start pumping after attempting to latch with nipple shield. Mother concerned about about pumping due to advice from her sister. Patients sister is a dental hygienist with 10month old baby and has been working with Maulik at [facility] out patient LC and is interested in becoming an LC. Mother also states sister has extra breast milk saved for her if needed. Discussed safety of milk that has not been through process to check contents.  Nb sleeping at right breast. Not well latched. Mothers nipple are both short and flat. Mother attempted rolling nipple little more erect but still short. Recommend continuing to use nipple shield and pumping and HE after each feed. Mother concerned about pumping for before 4 weeks due to advice from sister. Rn reviewed indications for pumping and why important and continuation of POC. Mother agreeable at this time.  Rn assisted mother to latch NB in cradle with nipple shield on left side. With continued stimulation and nipple " shield able to latch NB and feed for 10min effectively. Colostrum and saliva in nipple shield. Mother HE into spoon after feed. Reviewed storage of EBM,  Mother needs continued support in better positioning of NB and obtaining deeper latch. Reviewed normal nb feeding and what good feed looks like and what to look for in NB getting enough calories. Discussed POC for 37.3 weeks and sleepy NB and nipple shield and importance of insurance pumping.  POC  1.) Frequent STS by both parents for 1 hour every 3hours.  2.) Feeding on demand and at least every 3 hours.        Active feeding 10min at least with 20mm nipple shield        FOB supplement feeding with expressed colostrum via spoon or syringe         Mother to Pump or HE for 15-20min on initiate phase  3.) Out patient LC visit 1-2 days after discharge  Mother is considering starting hospital grade electric pump after net feeding per Rn recommendation.

## 2024-06-27 NOTE — CARE PLAN
The patient's goals for the shift include meet baby    The clinical goals for the shift include safe delivery      Problem: Vaginal Birth or  Section  Goal: Fetal and maternal status remain reassuring during the birth process  Outcome: Met     Problem: Pain - Adult  Goal: Verbalizes/displays adequate comfort level or baseline comfort level  Outcome: Met     Problem: Safety - Adult  Goal: Free from fall injury  Outcome: Met

## 2024-06-28 ENCOUNTER — APPOINTMENT (OUTPATIENT)
Dept: OBSTETRICS AND GYNECOLOGY | Facility: CLINIC | Age: 29
End: 2024-06-28
Payer: COMMERCIAL

## 2024-06-28 PROCEDURE — 2500000004 HC RX 250 GENERAL PHARMACY W/ HCPCS (ALT 636 FOR OP/ED): Performed by: OBSTETRICS & GYNECOLOGY

## 2024-06-28 PROCEDURE — 1220000001 HC OB SEMI-PRIVATE ROOM DAILY

## 2024-06-28 PROCEDURE — 2500000001 HC RX 250 WO HCPCS SELF ADMINISTERED DRUGS (ALT 637 FOR MEDICARE OP): Performed by: OBSTETRICS & GYNECOLOGY

## 2024-06-28 ASSESSMENT — PAIN SCALES - GENERAL
PAINLEVEL_OUTOF10: 3
PAINLEVEL_OUTOF10: 4

## 2024-06-28 ASSESSMENT — PAIN DESCRIPTION - DESCRIPTORS
DESCRIPTORS: CRAMPING
DESCRIPTORS: CRAMPING
DESCRIPTORS: CRAMPING;DULL

## 2024-06-28 ASSESSMENT — PAIN DESCRIPTION - LOCATION: LOCATION: ABDOMEN

## 2024-06-28 NOTE — LACTATION NOTE
Lactation Consultant Note  Lactation Consultation  Reason for Consult: Follow-up assessment  Consultant Name: Malka Gilbert RN    Maternal Information  Has mother  before?: No  Infant to breast within first 2 hours of birth?: Yes  Exclusive Pump and Bottle Feed: No    Maternal Assessment  Breast Assessment: Medium, Symmetrical, Firm, Compressible  Nipple Assessment: Intact, Flat, Short, Erect with stimulation  Areola Assessment: Normal    Infant Assessment  Infant Behavior: Deep sleep  Infant Assessment: Jaundice    Feeding Assessment       LATCH TOOL  Latch: Too sleepy or reluctant, no latch achieved  Audible Swallowing: None  Type of Nipple: Flat  Comfort (Breast/Nipple): Soft/non-tender  Hold (Positioning): No assist from staff, mother able to position/hold infant  LATCH Score: 5    Breast Pump  Pump: Hospital grade electric pump  Frequency: 5-7 times per day  Duration: 15-20 minutes per session  Breast Shield Size and Type: 21 mm  Volume of Milk Production:  (Discussed feeding plan with parents at this time, mother has pumped 2x in 24hours and has dome some HE geting 0.5ml-8ml, encouraged to start)    Other OB Lactation Tools  Lactation Tools: Nipple shields    Patient Follow-up  Inpatient Lactation Follow-up Needed : Yes  Outpatient Lactation Follow-up: Scheduled (Patients sister states she has told Marisol & Shyann from [facility] out patient LC that she will be coming next week and she is aware is holiday week and they only have LC IN Monday & Tuesday. RN again told pt to call [facility] out patient and schedule for monday)    Other OB Lactation Documentation  Maternal Risk Factors: Age over 30, primiparity, Flat or inverted nipple tissue,  delivery, Other (comment) (IUGR PROM BREECH O-)  Infant Risk Factors: Early term birth 37-39 weeks, Poor or painful latch / restricted feedings (BW 2860,2688, IUGR, sleepy)  Additional Problem Noted:  (TCB 10.1 High Rate of Rise, NB Jaundice, [facility] order  received)    Recommendations/Summary  Current update:  IBCLC in room to answer questions and review feeding plan and NB jaundice. Please see flowsheet for details

## 2024-06-28 NOTE — CARE PLAN
The patient's goals for the shift include bond with baby    The clinical goals for the shift include remain infection free

## 2024-06-28 NOTE — PROGRESS NOTES
Spiritual Care Visit    Clinical Encounter Type  Visited With: Patient not available (I tried to visit the patient on June 27.)

## 2024-06-28 NOTE — PROGRESS NOTES
Snehal Ball is a 29 y.o., , who had a , Low Transverse  delivery on 2024  at 37w3d and is now POD1.    She had Neuraxial Anesthesia without immediate complications noted.       Pain well controlled    Vitals:    24   BP: 112/71   Pulse: 79   Resp: 16   Temp: 36.7 °C (98.1 °F)   SpO2: 97%       Neuraxial site assessed. No visible redness or swelling or drainage. Patient able to ambulate and move all extremities without difficulty. Able to void. No complaints of nausea/vomiting. Tolerating PO intake well. No s/sx of PDPH.     Anesthesia will sign off     Jaleesa Silva, MANUELA-CRNA

## 2024-06-28 NOTE — PROGRESS NOTES
Spiritual Care Visit    Clinical Encounter Type  Visited With: Patient and family together  Routine Visit: Introduction  Continue Visiting: No    Orthodoxy Encounters  Orthodoxy Needs: Prayer, Literature         Sacramental Encounters  Communion: Patient wants communion  Communion Given Indicator: Yes                             Taxonomy  Intended Effects: Meaning-making, Establish rapport and connectedness, Joya affirmation, Build relationship of care and support, Demonstrate caring and concern

## 2024-06-28 NOTE — PROGRESS NOTES
"Postpartum Progress Note    Assessment/Plan   Snehal Ball is a 29 y.o., , who delivered at 37w3d gestation and is now postpartum day 2.    Pain well controlled  Ambulating well and urinating well  Bleeding minimal  VSS but had one mild range BP, no PIH symptoms.  Discussed DC home today vs tomorrow.  Will monitor BP's today and will make decision to DC later today.    Principal Problem:    PROM (premature rupture of membranes) ([facility])    Pregnancy Problems (from 23 to present)       Problem Noted Resolved    PROM (premature rupture of membranes) ([facility]) 2024 by Elis Helton MD No    Priority:  Medium      Breech presentation ([facility]) 6/10/2024 by Libia Negrete MD No    Priority:  Medium      Overview Addendum 2024  9:39 AM by Libia Negrete MD     - breech at 34.4 USN   - Primary c/s scheduled on  at 38.5          Fetal growth restriction antepartum ([facility]) 2024 by Libia Negrete MD No    Priority:  Medium      Overview Addendum 6/10/2024  8:06 AM by Libia Negrete MD     - EFW 12% at 34.4 with AC 9%  <> Continue weekly BPP/dopplers          Rh negative status during pregnancy in third trimester ([facility]) 2024 by Libia Negrete MD No    Priority:  Medium      Overview Signed 2024  3:06 PM by Libia Negrete MD     - s/p Rhogam           Encounter for supervision of normal first pregnancy in first trimester ([facility]) 2023 by Libia Negrete MD No    Priority:  Medium      Overview Addendum 2024 10:52 AM by Libia Negrete MD     - s/p flu vaccine  - Updated COVID vaccine recommended  - Normal 13 week USN    - Risk reducing NIPS   - It's a GIRL - \"Cassi\"  - Has breast pump   - s/p Tdap                Hospital course: no complications   section delivery  Patient is currently breastfeedingThe patient's blood type is O NEG. The baby's blood type is O NEG . Rhogam is not " indicated.    Subjective   Her pain is well controlled with current medications  She is passing flatus  She is ambulating well  She is tolerating a Adult diet Regular  She reports no breast or nursing problems  She denies emotional concerns today   Her plan for contraception is none     Pt doing well, breastfeeding, pain controlled with motrin/tylenol, bleeding minimal.    Objective   Allergies:   Patient has no known allergies.         Last Vitals:  Temp Pulse Resp BP MAP Pulse Ox   36.6 °C (97.9 °F) 60 17 143/81   99 %     Vitals Min/Max Last 24 Hours:  Temp  Min: 36.4 °C (97.5 °F)  Max: 36.7 °C (98.1 °F)  Pulse  Min: 60  Max: 79  Resp  Min: 16  Max: 17  BP  Min: 110/65  Max: 143/81    Intake/Output:     Intake/Output Summary (Last 24 hours) at 6/28/2024 1103  Last data filed at 6/27/2024 1626  Gross per 24 hour   Intake --   Output 550 ml   Net -550 ml       Physical Exam:  General: Examination reveals a well developed, well nourished, female, in no acute distress. She is alert and cooperative.  Incision: healing well, no drainage, no erythema, no swelling.  Fundus: firm and nontender.  Extremities: no redness or tenderness in the calves or thighs, no edema.  Neurological: DTRs normal and symmetrical.  Psychological: awake and alert; oriented to person, place, and time.    Lab Data:  Lab Results   Component Value Date    WBC 16.4 (H) 06/27/2024    HGB 12.8 06/27/2024    HCT 37.0 06/27/2024     06/27/2024

## 2024-06-29 VITALS
TEMPERATURE: 98.4 F | WEIGHT: 182 LBS | DIASTOLIC BLOOD PRESSURE: 84 MMHG | SYSTOLIC BLOOD PRESSURE: 134 MMHG | RESPIRATION RATE: 18 BRPM | HEIGHT: 64 IN | BODY MASS INDEX: 31.07 KG/M2 | OXYGEN SATURATION: 99 % | HEART RATE: 78 BPM

## 2024-06-29 PROCEDURE — 2500000001 HC RX 250 WO HCPCS SELF ADMINISTERED DRUGS (ALT 637 FOR MEDICARE OP): Performed by: OBSTETRICS & GYNECOLOGY

## 2024-06-29 RX ORDER — IBUPROFEN 200 MG
600 TABLET ORAL EVERY 6 HOURS PRN
COMMUNITY
Start: 2024-06-29

## 2024-06-29 RX ORDER — ACETAMINOPHEN 500 MG
1000 TABLET ORAL EVERY 6 HOURS PRN
COMMUNITY
Start: 2024-06-29

## 2024-06-29 ASSESSMENT — PAIN SCALES - GENERAL: PAINLEVEL_OUTOF10: 3

## 2024-06-29 NOTE — CARE PLAN
The patient's goals for the shift include bond with baby    The clinical goals for the shift include remain infection free    Over the shift, the patient did  make progress.

## 2024-06-29 NOTE — CARE PLAN
The patient's goals for the shift include go home    The clinical goals for the shift include discharge home safely    The pt met all goals    Problem: Discharge Planning  Goal: Discharge to home or other facility with appropriate resources  Outcome: Met

## 2024-06-29 NOTE — DISCHARGE SUMMARY
Discharge Summary    Admission Date: 2024  Discharge Date: 2024    Discharge Diagnosis  PROM (premature rupture of membranes) ([facility])  Breech presentation     Hospital Course  Delivery Date: 2024  8:02 PM   Delivery type: , Low Transverse    GA at delivery: 37w3d  Outcome: Living   Anesthesia during delivery: Spinal   Intrapartum complications: None   Feeding method: Breastfeeding Status: Yes     Procedures:  Primary  section     Pertinent Physical Exam At Time of Discharge  See rounding note from      Discharge Meds     Your medication list        START taking these medications        Instructions Last Dose Given Next Dose Due   acetaminophen 500 mg tablet  Commonly known as: Tylenol Extra Strength      Take 2 tablets (1,000 mg) by mouth every 6 hours if needed for mild pain (1 - 3).       ibuprofen 200 mg tablet  Commonly known as: AdviL      Take 3 tablets (600 mg) by mouth every 6 hours if needed for mild pain (1 - 3).              CONTINUE taking these medications        Instructions Last Dose Given Next Dose Due   PNV 12-iron-methylfolate-dha 29 mg iron-1 mg -350 mg comb pack,tablet DR,capsule DR                     Where to Get Your Medications        You can get these medications from any pharmacy    You don't need a prescription for these medications  acetaminophen 500 mg tablet  ibuprofen 200 mg tablet        Complications Requiring Follow-Up  None     Test Results Pending At Discharge  Pending Labs       Order Current Status    Surgical Pathology Exam - [facility] In process          Outpatient Follow-Up  Future Appointments   Date Time Provider Department Center   2024  2:40 PM MD KAREL Horne [place]   7/10/2024  3:40 PM MD KAREL Horne [place]

## 2024-06-29 NOTE — PROGRESS NOTES
Postpartum Progress Note    Assessment/Plan   Snehal Ball is a 29 y.o.,  who delivered at 37w3d gestation via primary  section due to PROM and breech at 37.3 in setting of IUGR based on AC <10%.   She is now postpartum day 3.    - Continue routine postpartum care.  Meeting post-operative discharge milestones.  - Hgb 13.7 --> 12.8  - Breast feeding.  Going well overall.   - Female infant.  Doing well.   - Rh negative blood type - baby also Rh negative so Rhogam not indicated   - Discharge home today  - Follow up  for incision check as scheduled on     Lab Results   Component Value Date    LABRH NEG 2024     Lab Results   Component Value Date    RUBIG Positive 2024       Principal Problem:    PROM (premature rupture of membranes) ([facility])    Pregnancy Problems (from 23 to present)       Problem Noted Resolved    PROM (premature rupture of membranes) ([facility]) 2024 by Elis Helton MD No    Priority:  Medium      Breech presentation ([facility]) 6/10/2024 by Libia Negrete MD No    Priority:  Medium      Overview Addendum 2024  9:39 AM by Libia Negrete MD     - breech at 34.4 USN   - Primary c/s scheduled on  at 38.5          Fetal growth restriction antepartum ([facility]) 2024 by Libia Negrete MD No    Priority:  Medium      Overview Addendum 6/10/2024  8:06 AM by Libia Negrete MD     - EFW 12% at 34.4 with AC 9%  <> Continue weekly BPP/dopplers          Rh negative status during pregnancy in third trimester ([facility]) 2024 by Libia Negrete MD No    Priority:  Medium      Overview Signed 2024  3:06 PM by Libia Negrete MD     - s/p Rhogam           Encounter for supervision of normal first pregnancy in first trimester ([facility]) 2023 by Libia Negrete MD No    Priority:  Medium      Overview Addendum 2024 10:52 AM by Libia Negrete MD     - s/p flu vaccine  - Updated COVID vaccine  "recommended  - Normal 13 week USN    - Risk reducing NIPS   - It's a GIRL - \"Cassi\"  - Has breast pump   - s/p Tdap                Hospital course: no complications    Subjective   Pt doing well after delivery.  Moderate lochia. Ambulating and voiding without issue.  Denies nausea/vomiting.  She is passing flatus. Her pain is well controlled overall.  She is not taking narcotic pain medication. Breastfeeding is going well overall.     Objective   Allergies:   Patient has no known allergies.         Last Vitals:  /84   Pulse 100   Temp 36.8 °C (98.2 °F) (Oral)   Resp 18   Ht 1.626 m (5' 4\")   Wt 82.6 kg (182 lb)   LMP 10/08/2023 (Exact Date)   SpO2 99%   Breastfeeding Yes   BMI 31.24 kg/m²     Physical Exam:  General: Examination reveals a well developed, well nourished, female, in no acute distress. She is alert and cooperative.  HEENT: PERRLA.  Lungs: Respirations unlabored  Abdomen: Soft, nondistended, appropriately tender to palpation for post-operative course, no rebound or guarding   Incision:  Dressing in place - c/d/I - will be removed at outpatient incision check after discharge  Extremities: no redness or tenderness in the calves or thighs, no edema  Psychological: awake and alert; oriented to person, place, and time    Lab Data:  Lab Results   Component Value Date    HGB 12.8 06/27/2024    HGB 13.7 06/26/2024       "

## 2024-07-02 ENCOUNTER — LACTATION CONSULT (OUTPATIENT)
Dept: LACTATION | Facility: CLINIC | Age: 29
End: 2024-07-02
Payer: COMMERCIAL

## 2024-07-02 DIAGNOSIS — O92.70 LACTATION PROBLEM (HHS-HCC): Primary | ICD-10-CM

## 2024-07-02 PROCEDURE — 99211 OFF/OP EST MAY X REQ PHY/QHP: CPT | Performed by: EMERGENCY MEDICINE

## 2024-07-03 ENCOUNTER — LACTATION CONSULT (OUTPATIENT)
Dept: LACTATION | Facility: CLINIC | Age: 29
End: 2024-07-03
Payer: COMMERCIAL

## 2024-07-03 ENCOUNTER — TELEPHONE (OUTPATIENT)
Dept: OBSTETRICS AND GYNECOLOGY | Facility: CLINIC | Age: 29
End: 2024-07-03

## 2024-07-03 ENCOUNTER — APPOINTMENT (OUTPATIENT)
Dept: RADIOLOGY | Facility: CLINIC | Age: 29
End: 2024-07-03
Payer: COMMERCIAL

## 2024-07-03 DIAGNOSIS — O92.70 LACTATION PROBLEM (HHS-HCC): Primary | ICD-10-CM

## 2024-07-03 PROCEDURE — 99211 OFF/OP EST MAY X REQ PHY/QHP: CPT | Performed by: EMERGENCY MEDICINE

## 2024-07-03 NOTE — PROGRESS NOTES
"Patient presents for her 1 week Postpartum Visit, incision check  Delivered Primary  2024 PROM  EPDS = 6  Breastfeeding: Yes   C/O: None     Sara Ch MA II    Postpartum Visit    HPI:  Pt presents for her incision check  s/p  section for breech in setting of IUGR and PROM on  without complications.  She had a baby girl.  She is Breast feeding. Post-operatively, she is doing great. She has not taken any pain medications including tylenol/motrin in the past few days.  She is having normal, regular Bms and has no voiding issues.      ROS:  Denies the following: Complains of the following:    - episiotomy site pain   - incisional pain  - incisional drainage  - heavy bleeding  - irregular bleeding  - breast pain  - cracked nipples  - breastfeeding problems  - abdominal pain  - vaginal discharge  - shortness of breath  - chest pain  - back pain  - leg pain  - depression  - suicidal ideation  - nausea  - vomiting  - fever  - pain with urination  - tiredness or fatigue  - headache no pertinent positives        O:  /82 (BP Location: Right arm, Patient Position: Sitting)   Pulse 78   Ht 1.626 m (5' 4\")   Wt 74.8 kg (164 lb 12.8 oz)   LMP 10/08/2023 (Exact Date)   SpO2 98%   Breastfeeding Yes   BMI 28.29 kg/m²     General Appearance   - consistent with stated age, well groomed and cooperative    Integumentary  - skin warm and dry without rash    Head and Neck  - normalocephalic and neck supple    Chest and Lung Exam  - normal breathing effort, no respiratory distress    Abdomen  - soft, nontender, nondistended, incision - dressing removed - incision c/d/I without drainage or erythema     Peripheral Vascular  - no edema present    A/P:   Pt is a 29 y.o.  who presents for incision check .  Doing well overall postpartum.  Coping well with new baby at home.  [place]  Depression Scale Total: 6.  Breastfeeding going well.  Baby doing well.   F/u 6 week postpartum visit or " sooner as needed.

## 2024-07-03 NOTE — TELEPHONE ENCOUNTER
Patient called needing a confirmation sent that she delivered via  to [facility] for short term disability.

## 2024-07-03 NOTE — PROGRESS NOTES
Lactation Counseling Note    Subjective:    Snehal Ball is a 29 y.o. patient referred for lactation counseling. She is here today due to Latch issues. She was referred by her  sister .     OB HISTORY:   Baby Name: Cassi  Healthcare Provider: [facility]  /Para: : 1  Para: 1  Weeks Gestation: 37  Mode of Delivery: Primary  section  Delivery Complications: Malposition/Malpresentation  Maternal Complications: None  [place] Information: Baby's Name: Cassi  : 24  Place of Birth: [facility]  Healthcare Provider: Bonnie  Skin to skin contact: First hour  Birth weight: 6lbs 4oz    Objective:    BREASTFEEDING ASSESSMENT:   Physical Exam    Breast Assessment: Large, Pendulous, Symmetrical, Full, Warm, and Compressible  Nipple Assessment/Stage: intact, bruised, short, flat, creased after feeding, and sore Stage I - Pain or irritation with no skin breakdown  Areola: Normal  Feeding Position: baby - led, c - hold, cradle, laid back, skin to skin, both sides, mother demonstrates good positioning, and nose or chin not touching breast  Latch: maximum assistance is needed, shallow latch, deep latch obtained, optimal angle of mouth opening, latch is painful, sucking and swallowing, bursts of sucking, swallowing, and rest, flanged lips, clenched jaws, comfortable latch, and minimal audible swallowing  Suck/Feeding: unsustained, audible swallowing with stimulation, cheeks dimple during sucking, nipple shield used, and content after feeding  Alternative Feeding Methods: nursing at the breast, finger feeding, and syringe feeding  Infant Feeding Method: Breast milk only  Infant Behavior: awake, sleepy, sucking, and content after feeding  Infant Information: Jaundice  Ankyloglossia  Receding chin  Palate- high/arch/bubble/normal  Poor occlusion of lips around areola  Breast Pain: Pain scale 0-10 (10 most pain): 1  Nipple Pain: Pain scale 0-10 (10 most pain): 1  Weight: Weight before feedinlbs  12.9  Weight after feedinlbs 13.5oz  Amount of breast milk transferred: 0.7oz ml  Number of voids in the last 24 hours: 8  Number of stools in the last 24 hours: 4-5    PATIENT DISCUSSION SUMMARY:  Mom here for weight check and assist with latch.  Mom was referred by her sister.  Observed to feed baby appears to have a very shallow latch mom denies pain but nipples very red and irritated.  Baby did not transfer much tried with the shield.  Audible swallows noted baby transferred 0.7 ounces from both breasts.  Mom encouraged to pump 4-5 times a day and give what she gets to baby via syringe or paste bottlefeed.  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  Mom to follow-up post revision if she decides or Monday for weight check.  LACTATION PROBLEMS AND STRATEGIES:  Problems latching baby to breast: Baby should latch to areola (dark area) not just the nipple.  Baby's lips should be flanged outward.  Bring the baby up to the level of your breast by putting a pillow under the baby.  Do not use bottles or pacifiers until latching on well.  Dribble milk over the nipple or express milk so that baby can taste it  Encourage a deeper latch with the asymetrical latch- lining baby's nose opposite mother's nipple.  Encourage nipple to stand up prior to feeing by pumping, nipple rolling or by brief application of ice.  Gently tickle your baby's lip with your nipple to encourage your baby to open his/her mouth wide.  Hold baby so that your breast is positioned deep in the baby's mouth.  Hold your baby close, tummy to tummy.  If baby does not latch to breast, express breast milk.  If engorged, express some milk to soften breast.  If the baby is not latched on well, break seal and gently try again.  Keep baby skin to skin and watch  for feeding cues.  Massage breast to start milk-ejection reflex.  Place nipple and at least 1 inch of areola into baby's mouth.  Place your hand behind the baby's neck and shoulder.  Do not force baby's head into breast.  Put baby in the football hold or clutch hold, supporting his/her neck and head in sniffing position to open his/her throat.  Shape breast into oval shape (sandwich hold)  for deep latch.  Try different feeding positions (cradle, football, side etc.).  Use a breast feeding pillow to bring baby up to the level of the breast.  Use nipple shield and monitor baby's weight gain and output.  Use semi-reclined feeding position to allow baby to take an active role and trigger baby's inborn feeding behavior.  Use your hand to support your breast during feeding.  When your baby's mouth is wide open, quickly pull baby into your breast.  Your baby's chin should be pressed into your breast.  PATIENT INSTRUCTION HANDOUTS GIVEN:      LACTATION EDUCATION:  Waking Techniques, Correct Positioning, Correct Latch On, and Demo use of Pump

## 2024-07-03 NOTE — PATIENT INSTRUCTIONS
Mom here for weight check and assist with latch.  Mom was referred by her sister.  Observed to feed baby appears to have a very shallow latch mom denies pain but nipples very red and irritated.  Baby did not transfer much tried with the shield.  Audible swallows noted baby transferred 0.7 ounces from both breasts.  Mom encouraged to pump 4-5 times a day and give what she gets to baby via syringe or paste bottlefeed.  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  Mom to follow-up post revision if she decides or Monday for weight check.

## 2024-07-05 ENCOUNTER — APPOINTMENT (OUTPATIENT)
Dept: OBSTETRICS AND GYNECOLOGY | Facility: CLINIC | Age: 29
End: 2024-07-05
Payer: COMMERCIAL

## 2024-07-05 VITALS
BODY MASS INDEX: 28.13 KG/M2 | SYSTOLIC BLOOD PRESSURE: 123 MMHG | HEART RATE: 78 BPM | HEIGHT: 64 IN | WEIGHT: 164.8 LBS | OXYGEN SATURATION: 98 % | DIASTOLIC BLOOD PRESSURE: 82 MMHG

## 2024-07-05 DIAGNOSIS — Z48.89 ENCOUNTER FOR POST SURGICAL WOUND CHECK: Primary | ICD-10-CM

## 2024-07-05 PROBLEM — O26.893 RH NEGATIVE STATUS DURING PREGNANCY IN THIRD TRIMESTER (HHS-HCC): Status: RESOLVED | Noted: 2024-05-01 | Resolved: 2024-07-05

## 2024-07-05 PROBLEM — O36.5990 FETAL GROWTH RESTRICTION ANTEPARTUM (HHS-HCC): Status: RESOLVED | Noted: 2024-05-17 | Resolved: 2024-07-05

## 2024-07-05 PROBLEM — O42.90 PROM (PREMATURE RUPTURE OF MEMBRANES) (HHS-HCC): Status: RESOLVED | Noted: 2024-06-26 | Resolved: 2024-07-05

## 2024-07-05 PROBLEM — Z34.01 ENCOUNTER FOR SUPERVISION OF NORMAL FIRST PREGNANCY IN FIRST TRIMESTER (HHS-HCC): Status: RESOLVED | Noted: 2023-12-06 | Resolved: 2024-07-05

## 2024-07-05 PROBLEM — Z67.91 RH NEGATIVE STATUS DURING PREGNANCY IN THIRD TRIMESTER (HHS-HCC): Status: RESOLVED | Noted: 2024-05-01 | Resolved: 2024-07-05

## 2024-07-05 LAB
LABORATORY COMMENT REPORT: NORMAL
PATH REPORT.FINAL DX SPEC: NORMAL
PATH REPORT.GROSS SPEC: NORMAL
PATH REPORT.RELEVANT HX SPEC: NORMAL
PATH REPORT.TOTAL CANCER: NORMAL

## 2024-07-05 ASSESSMENT — EDINBURGH POSTNATAL DEPRESSION SCALE (EPDS)
THE THOUGHT OF HARMING MYSELF HAS OCCURRED TO ME: NEVER
I HAVE BEEN ANXIOUS OR WORRIED FOR NO GOOD REASON: YES, SOMETIMES
THINGS HAVE BEEN GETTING ON TOP OF ME: NO, MOST OF THE TIME I HAVE COPED QUITE WELL
I HAVE FELT SCARED OR PANICKY FOR NO GOOD REASON: NO, NOT MUCH
I HAVE BEEN SO UNHAPPY THAT I HAVE BEEN CRYING: NO, NEVER
I HAVE BEEN ABLE TO LAUGH AND SEE THE FUNNY SIDE OF THINGS: AS MUCH AS I ALWAYS COULD
TOTAL SCORE: 6
I HAVE FELT SAD OR MISERABLE: NO, NOT AT ALL
I HAVE LOOKED FORWARD WITH ENJOYMENT TO THINGS: AS MUCH AS I EVER DID
I HAVE BLAMED MYSELF UNNECESSARILY WHEN THINGS WENT WRONG: YES, SOME OF THE TIME
I HAVE BEEN SO UNHAPPY THAT I HAVE HAD DIFFICULTY SLEEPING: NOT AT ALL

## 2024-07-05 ASSESSMENT — PAIN SCALES - GENERAL: PAINLEVEL: 0-NO PAIN

## 2024-07-08 ENCOUNTER — LACTATION CONSULT (OUTPATIENT)
Dept: LACTATION | Facility: CLINIC | Age: 29
End: 2024-07-08
Payer: COMMERCIAL

## 2024-07-08 DIAGNOSIS — O92.70 LACTATION PROBLEM (HHS-HCC): Primary | ICD-10-CM

## 2024-07-08 PROCEDURE — 99211 OFF/OP EST MAY X REQ PHY/QHP: CPT | Performed by: EMERGENCY MEDICINE

## 2024-07-08 NOTE — PROGRESS NOTES
Lactation Counseling Note    Subjective:    Snehal Ball is a 29 y.o. patient referred for lactation counseling. She is here today due to Latch issues. She was referred by her  sister .     OB HISTORY:   Mode of Delivery: Primary  section    Objective:    BREASTFEEDING ASSESSMENT:   Physical Exam    Breast Assessment: Medium, Symmetrical, Full, Soft, Warm, Compressible, and Readiness to feed  Nipple Assessment/Stage: intact, short, rounded after feeding, and sore Stage I - Pain or irritation with no skin breakdown  Areola: Normal  Feeding Position: cradle, cross - cradle, laid back, skin to skin, both sides, nipple to nose, and mother demonstrates good positioning  Latch: minimal assistance is needed, instructed on deep latch, latch achieved, sucking and swallowing, bursts of sucking, swallowing, and rest, comfortable latch, and correct tongue position  Suck/Feeding: sustained, audible swallowing, and nipple shield used  Infant Feeding Method: Breast milk only  Infant Behavior: awake, quiet alert, readiness to feed, and content after feeding  Nipple Pain: Pain scale 0-10 (10 most pain): 0  Weight: Weight before feedinlbs 14.3oz  Weight after feedinlbs 15.0oz  Amount of breast milk transferred: 1.3oz ml  Number of voids in the last 24 hours: 8  Number of stools in the last 24 hours: 4    PATIENT DISCUSSION SUMMARY:  Mom to here follow-up weight check and post frenectomy assessment.  Baby   With no weight gain in 3 days mom states baby was released last Wednesday mom continues to use shield baby unable to latch without shield.  Assisted with latch without shield baby causing mom pain.  Mom to continue using shield and we will try to wean from shield next week baby transferred 0.7ounces.  Mom to pump 4 times a day and supplement baby with 1 ounce of expressed breastmilk via syringe or paste bottlefeeding mom to follow-up on Wednesday, July 10 for weight check  LACTATION PROBLEMS AND  STRATEGIES:  Problems latching baby to breast: Baby should latch to areola (dark area) not just the nipple.  Baby's lips should be flanged outward.  Bring the baby up to the level of your breast by putting a pillow under the baby.  Do not use bottles or pacifiers until latching on well.  Dribble milk over the nipple or express milk so that baby can taste it  Encourage a deeper latch with the asymetrical latch- lining baby's nose opposite mother's nipple.  Encourage nipple to stand up prior to feeing by pumping, nipple rolling or by brief application of ice.  Gently tickle your baby's lip with your nipple to encourage your baby to open his/her mouth wide.  Hold baby so that your breast is positioned deep in the baby's mouth.  Hold your baby close, tummy to tummy.  If baby does not latch to breast, express breast milk.  If engorged, express some milk to soften breast.  If the baby is not latched on well, break seal and gently try again.  Keep baby skin to skin and watch for feeding cues.  Massage breast to start milk-ejection reflex.  Place nipple and at least 1 inch of areola into baby's mouth.  Place your hand behind the baby's neck and shoulder.  Do not force baby's head into breast.  Put baby in the football hold or clutch hold, supporting his/her neck and head in sniffing position to open his/her throat.  Shape breast into oval shape (sandwich hold)  for deep latch.  Try different feeding positions (cradle, football, side etc.).  Use a breast feeding pillow to bring baby up to the level of the breast.  Use nipple shield and monitor baby's weight gain and output.  Use semi-reclined feeding position to allow baby to take an active role and trigger baby's inborn feeding behavior.  Use your hand to support your breast during feeding.  When your baby's mouth is wide open, quickly pull baby into your breast.  Your baby's chin should be pressed into your breast.  PATIENT INSTRUCTION HANDOUTS GIVEN:      LACTATION  EDUCATION:  Correct Positioning and Correct Latch On

## 2024-07-08 NOTE — PROGRESS NOTES
Lactation Counseling Note    Subjective:    Snehal Ball is a 29 y.o. patient referred for lactation counseling. She is here today due to Sore/cracked nipple(s) and Latch issues. She was referred by her  sister .     OB HISTORY:   Mode of Delivery: Primary  section    Objective:    BREASTFEEDING ASSESSMENT:   Physical Exam    Breast Assessment: Medium, Symmetrical, Filling, Soft, Warm, and Compressible  Nipple Assessment/Stage: intact, erect, compression stripe, and sore Stage I - Pain or irritation with no skin breakdown  Areola: Normal  Feeding Position: baby - led, breast sandwich, c - hold, laid back, skin to skin, both sides, switch nursing, nipple to nose, and mother demonstrates good positioning  Latch: too sleepy, moderate assistance is needed, latch achieved, latch achieved after repeated attempts, comfortable with no pain, bursts of sucking, swallowing, and rest, and comfortable latch  Suck/Feeding: unsustained, disorganized suck, audible swallowing with stimulation, nipple shield used, and content after feeding  Alternative Feeding Methods: paced bottle and syringe feeding  Infant Feeding Method: Breast milk only  Infant Behavior: quiet alert, sleepy, feeding cues observed, suckles on and off, needs stimulation, and content after feeding  Infant Information: Post status frenotomy  Receding chin  Poor occlusion of lips around areola  Good cupping of tongue  Good lateral movement of the tongue  Able to elevate tongue to roof of mouth  Nipple Pain: Pain scale 0-10 (10 most pain): 0  Weight: Weight before feedinlbs 12.5oz  Weight after feedinlbs13.4  Amount of breast milk transferred: 0.9oz ml  Number of voids in the last 24 hours: 8  Number of stools in the last 24 hours: 3    PATIENT DISCUSSION Mom here for follow-up weight check baby up 2 ounces in 1 day.  Mom pumping after every feed and supplementing with half an ounce of breastmilk assisted and able and latch baby on with mom baby  transferred 0.9 ounces of breastmilk after a very long feeding session.  Noted a significant restricted tongue-tie mom complains of 5 out of 10 pain attempted with the shield mom states improved her pain.  Mom will continue to use the shield and triple feed until she sees pediatrician to discuss the tongue-tie.  Follow-up on Monday for a weight check.  LACTATION PROBLEMS AND STRATEGIES:  Problems latching baby to breast: Baby should latch to areola (dark area) not just the nipple.  Baby's lips should be flanged outward.  Bring the baby up to the level of your breast by putting a pillow under the baby.  Do not use bottles or pacifiers until latching on well.  Dribble milk over the nipple or express milk so that baby can taste it  Encourage a deeper latch with the asymetrical latch- lining baby's nose opposite mother's nipple.  Encourage nipple to stand up prior to feeing by pumping, nipple rolling or by brief application of ice.  Gently tickle your baby's lip with your nipple to encourage your baby to open his/her mouth wide.  Hold baby so that your breast is positioned deep in the baby's mouth.  Hold your baby close, tummy to tummy.  If baby does not latch to breast, express breast milk.  If engorged, express some milk to soften breast.  If the baby is not latched on well, break seal and gently try again.  Keep baby skin to skin and watch for feeding cues.  Massage breast to start milk-ejection reflex.  Place nipple and at least 1 inch of areola into baby's mouth.  Place your hand behind the baby's neck and shoulder.  Do not force baby's head into breast.  Put baby in the football hold or clutch hold, supporting his/her neck and head in sniffing position to open his/her throat.  Shape breast into oval shape (sandwich hold)  for deep latch.  Try different feeding positions (cradle, football, side etc.).  Use a breast feeding pillow to bring baby up to the level of the breast.  Use nipple shield and monitor baby's  weight gain and output.  Use semi-reclined feeding position to allow baby to take an active role and trigger baby's inborn feeding behavior.  Use your hand to support your breast during feeding.  When your baby's mouth is wide open, quickly pull baby into your breast.  Your baby's chin should be pressed into your breast.  PATIENT INSTRUCTION HANDOUTS GIVEN:      LACTATION EDUCATION:  Correct Positioning, Correct Latch On, and Demo use of Pump

## 2024-07-10 ENCOUNTER — LACTATION CONSULT (OUTPATIENT)
Dept: LACTATION | Facility: CLINIC | Age: 29
End: 2024-07-10
Payer: COMMERCIAL

## 2024-07-10 ENCOUNTER — APPOINTMENT (OUTPATIENT)
Dept: OBSTETRICS AND GYNECOLOGY | Facility: CLINIC | Age: 29
End: 2024-07-10
Payer: COMMERCIAL

## 2024-07-10 DIAGNOSIS — O92.70 LACTATION PROBLEM (HHS-HCC): Primary | ICD-10-CM

## 2024-07-10 PROCEDURE — 99211 OFF/OP EST MAY X REQ PHY/QHP: CPT | Performed by: EMERGENCY MEDICINE

## 2024-07-10 NOTE — PROGRESS NOTES
Lactation Counseling Note    Subjective:    Snehal Ball is a 29 y.o. patient referred for lactation counseling. She is here today due to Latch issues. She was referred by her  sister .     OB HISTORY:   Baby Name: Cassi    Objective:    BREASTFEEDING ASSESSMENT:   Physical Exam    Latch: minimal assistance is needed, deep latch obtained, and comfortable latch  Suck/Feeding: sustained and content after feeding    PATIENT DISCUSSION SUMMARY: Pt here for weight check, baby up 2.3oz in 2 days. Baby transferred 2.2oz today. Baby had frenotomy done one week ago, healing well, stretches done here. Will follow up for weight check in 2 days.     LACTATION PROBLEMS AND STRATEGIES:  Problems latching baby to breast: Baby should latch to areola (dark area) not just the nipple.  Baby's lips should be flanged outward.  Bring the baby up to the level of your breast by putting a pillow under the baby.  Do not use bottles or pacifiers until latching on well.  Dribble milk over the nipple or express milk so that baby can taste it  Encourage a deeper latch with the asymetrical latch- lining baby's nose opposite mother's nipple.  Encourage nipple to stand up prior to feeing by pumping, nipple rolling or by brief application of ice.  Gently tickle your baby's lip with your nipple to encourage your baby to open his/her mouth wide.  Hold baby so that your breast is positioned deep in the baby's mouth.  Hold your baby close, tummy to tummy.  If baby does not latch to breast, express breast milk.  If engorged, express some milk to soften breast.  If the baby is not latched on well, break seal and gently try again.  Keep baby skin to skin and watch for feeding cues.  Massage breast to start milk-ejection reflex.  Place nipple and at least 1 inch of areola into baby's mouth.  Place your hand behind the baby's neck and shoulder.  Do not force baby's head into breast.  Put baby in the football hold or clutch hold, supporting his/her neck  and head in sniffing position to open his/her throat.  Shape breast into oval shape (sandwich hold)  for deep latch.  Try different feeding positions (cradle, football, side etc.).  Use a breast feeding pillow to bring baby up to the level of the breast.  Use nipple shield and monitor baby's weight gain and output.  Use semi-reclined feeding position to allow baby to take an active role and trigger baby's inborn feeding behavior.  Use your hand to support your breast during feeding.  When your baby's mouth is wide open, quickly pull baby into your breast.  Your baby's chin should be pressed into your breast.  PATIENT INSTRUCTION HANDOUTS GIVEN:   .  LACTATION EDUCATION:  Correct Positioning and Correct Latch On

## 2024-07-10 NOTE — PATIENT INSTRUCTIONS
Pt here for weight check, baby up 2.3oz in 2 days. Baby transferred 2.2oz today. Baby had frenotomy done one week ago, healing well, stretches done here. Will follow up for weight check in 2 days.

## 2024-07-10 NOTE — PATIENT INSTRUCTIONS
Mom to here follow-up weight check and post frenectomy assessment.  Baby   With no weight gain in 3 days mom states baby was released last Wednesday mom continues to use shield baby unable to latch without shield.  Assisted with latch without shield baby causing mom pain.  Mom to continue using shield and we will try to wean from shield next week baby transferred 0.7ounces.  Mom to pump 4 times a day and supplement baby with 1 ounce of expressed breastmilk via syringe or paste bottlefeeding mom to follow-up on Wednesday, July 10 for weight check

## 2024-07-11 ENCOUNTER — APPOINTMENT (OUTPATIENT)
Dept: RADIOLOGY | Facility: CLINIC | Age: 29
End: 2024-07-11
Payer: COMMERCIAL

## 2024-07-12 ENCOUNTER — LACTATION CONSULT (OUTPATIENT)
Dept: LACTATION | Facility: CLINIC | Age: 29
End: 2024-07-12
Payer: COMMERCIAL

## 2024-07-12 ENCOUNTER — APPOINTMENT (OUTPATIENT)
Dept: LACTATION | Facility: CLINIC | Age: 29
End: 2024-07-12
Payer: COMMERCIAL

## 2024-07-12 DIAGNOSIS — O92.70 LACTATION PROBLEM (HHS-HCC): Primary | ICD-10-CM

## 2024-07-12 PROCEDURE — 99211 OFF/OP EST MAY X REQ PHY/QHP: CPT | Performed by: EMERGENCY MEDICINE

## 2024-07-12 NOTE — PATIENT INSTRUCTIONS
"Mom and baby Cassi here for continued follow up post tongue and lip tie release on 7-3-24. Mom reports feedings going well, using nipple shield, without discomfort. Baby have many wets and stools. Baby weighed prior to feed at 6 lb 3.1 oz. A gain of 2.5 oz in 2 days which is excellent, mom pleased. Mom latched baby independently with shield to right breast. Assisted to obtain deeper latch on shield by drawing infant's chin down while sucking. Good rhythmic sucks with many swallows. Milk in shield when released. Nipple round. Infant weighed again and transferred 1.2 oz per test weight. Aftercare stretches done, release sight under tongue bled ever so slightly, but overall appear to within normal limits for 9 days post procedure. Assisted with attempt without shield to left breast. Infant would latch, but could not yet maintain wide gape with sustained suck and swallows. Nipple shield applied, infant latched immediately with very good sucks and swallows. Infant reweighed and transferred an additional 1.0 oz for a total of 2.2 oz this feeding. Infant content after feeding. Mom continues to pump 3-4 x/day for \"insurance\" and \"keeping the milk moving\". Discussed and mom aware that weaning off use of nipple shield is a process and may take some time especially because infant was 37 weeks at birth. Plans return Monday 7-15-24 for continued follow up.   "

## 2024-07-12 NOTE — PROGRESS NOTES
"Lactation Counseling Note    Subjective:    Snehal Ball is a 29 y.o. patient referred for lactation counseling. She is here today due to Sore/cracked nipple(s) and Latch issues. She was referred by her  sister who was patient here .     OB HISTORY:   /Para: : 1  Para: 1    Objective:    BREASTFEEDING ASSESSMENT:   Physical Exam    Breast Assessment: Large, Full, Compressible, and Readiness to feed  Nipple Assessment/Stage: intact, short, erect with stimulation, and rounded after feeding none  Areola: Normal  Feeding Position: baby - led, breast sandwich, c - hold, cross - cradle, skin to skin, both sides, nipple to nose, mother needs assistance with latch/positioning, and misalignment of baby's head, trunk, and hips  Latch: minimal assistance is needed, eagerly grasped on to latch, deep latch obtained, comfortable with no pain, sucking and swallowing, sucks with long jaw movement, chin and lower lip contact breast first, upper lip turned in, lower lip turned in, chin moves in rhythmic motion, comfortable latch, frequent audible swallows, and latched with shield  Suck/Feeding: sustained, coordinated suck/swallow/breathe, tactile stimulation needed, audible swallowing, audible swallowing with stimulation, nipple shield used, and content after feeding  Infant Behavior: light sleep, readiness to feed, feeding cues observed, rooting response, sucking, and content after feeding  Breast Pain: none  Nipple Pain: none  Weight: Weight before feedin lb 3.1 oz  Weight after feedin lb 5.3 oz  Amount of breast milk transferred: 2.2 oz ml  Number of voids in the last 24 hours: 8+  Number of stools in the last 24 hours: \"so many\" , explosive yellow seedy stool    PATIENT DISCUSSION SUMMARY: Mom and baby Cassi here for continued follow up post tongue and lip tie release on 7-3-24. Mom reports feedings going well, using nipple shield, without discomfort. Baby have many wets and stools. Baby weighed prior to " "feed at 6 lb 3.1 oz. A gain of 2.5 oz in 2 days which is excellent, mom pleased. Mom latched baby independently with shield to right breast. Assisted to obtain deeper latch on shield by drawing infant's chin down while sucking. Good rhythmic sucks with many swallows. Milk in shield when released. Nipple round. Infant weighed again and transferred 1.2 oz per test weight. Aftercare stretches done, release sight under tongue bled ever so slightly, but overall appear to within normal limits for 9 days post procedure. Assisted with attempt without shield to left breast. Infant would latch, but could not yet maintain wide gape with sustained suck and swallows. Nipple shield applied, infant latched immediately with very good sucks and swallows. Infant reweighed and transferred an additional 1.0 oz for a total of 2.2 oz this feeding. Infant content after feeding. Mom continues to pump 3-4 x/day for \"insurance\" and \"keeping the milk moving\". Discussed and mom aware that weaning off use of nipple shield is a process and may take some time especially because infant was 37 weeks at birth. Plans return Monday 7-15-24 for continued follow up.     LACTATION PROBLEMS AND STRATEGIES:  Problems latching baby to breast: Baby should latch to areola (dark area) not just the nipple.  Baby's lips should be flanged outward.  Bring the baby up to the level of your breast by putting a pillow under the baby.  Do not use bottles or pacifiers until latching on well.  Dribble milk over the nipple or express milk so that baby can taste it  Encourage a deeper latch with the asymetrical latch- lining baby's nose opposite mother's nipple.  Encourage nipple to stand up prior to feeing by pumping, nipple rolling or by brief application of ice.  Gently tickle your baby's lip with your nipple to encourage your baby to open his/her mouth wide.  Hold baby so that your breast is positioned deep in the baby's mouth.  Hold your baby close, tummy to " tummy.  If baby does not latch to breast, express breast milk.  If engorged, express some milk to soften breast.  If the baby is not latched on well, break seal and gently try again.  Keep baby skin to skin and watch for feeding cues.  Massage breast to start milk-ejection reflex.  Place nipple and at least 1 inch of areola into baby's mouth.  Place your hand behind the baby's neck and shoulder.  Do not force baby's head into breast.  Put baby in the football hold or clutch hold, supporting his/her neck and head in sniffing position to open his/her throat.  Shape breast into oval shape (sandwich hold)  for deep latch.  Try different feeding positions (cradle, football, side etc.).  Use a breast feeding pillow to bring baby up to the level of the breast.  Use nipple shield and monitor baby's weight gain and output.  Use semi-reclined feeding position to allow baby to take an active role and trigger baby's inborn feeding behavior.  Use your hand to support your breast during feeding.  When your baby's mouth is wide open, quickly pull baby into your breast.  Your baby's chin should be pressed into your breast.  PATIENT INSTRUCTION HANDOUTS GIVEN:      LACTATION EDUCATION:  Correct Positioning and Correct Latch On

## 2024-07-15 ENCOUNTER — LACTATION CONSULT (OUTPATIENT)
Dept: LACTATION | Facility: CLINIC | Age: 29
End: 2024-07-15
Payer: COMMERCIAL

## 2024-07-15 DIAGNOSIS — O92.70 LACTATION PROBLEM (HHS-HCC): Primary | ICD-10-CM

## 2024-07-15 PROCEDURE — 99211 OFF/OP EST MAY X REQ PHY/QHP: CPT | Performed by: EMERGENCY MEDICINE

## 2024-07-16 NOTE — PROGRESS NOTES
Lactation Counseling Note    Subjective:    Snehal Ball is a 29 y.o. patient referred for lactation counseling. She is here today due to Latch issues. She was referred by her  sister .     OB HISTORY:   Mode of Delivery: Primary  section    Objective:    BREASTFEEDING ASSESSMENT:   Physical Exam    Breast Assessment: Medium, Pendulous, Symmetrical, Full, Soft, Warm, and Compressible  Nipple Assessment/Stage: intact, short, and rounded after feeding Stage I - Pain or irritation with no skin breakdown  Areola: Normal  Feeding Position: baby - led, c - hold, cross - cradle, skin to skin, both sides, switch nursing, and nipple to nose  Latch: moderate assistance is needed, eagerly grasped on to latch, cries while latching, latch achieved, sucking and swallowing, sucks with long jaw movement, flanged lips, comfortable latch, and frequent audible swallows  Suck/Feeding: sustained, audible swallowing, nipple shield used, and content after feeding  Alternative Feeding Methods: nursing at the breast and paced bottle  Infant Feeding Method: Breast milk only  Infant Behavior: awake, active alert, readiness to feed, and content after feeding  Infant Information: Post status frenotomy  Receding chin  Poor occlusion of lips around areola  Good cupping of tongue  Good lateral movement of the tongue  Able to elevate tongue to roof of mouth  Nipple Pain: Pain scale 0-10 (10 most pain): 1  Weight: Weight before feedinlbs 7.7oz  Weight after feedinlbs 10.5oz  Amount of breast milk transferred: 2.8oz ml  Number of voids in the last 24 hours: 4  Number of stools in the last 24 hours: 8    PATIENT DISCUSSION SUMMARY: Mom here for follow-up weight check and assistance nursing without a shield.  Baby up 4 ounces in 3 days.  Mom feels baby is nursing very well denies pain or problems baby quickly transferred 2.8 ounces at the breast using the shield.  Baby was very fussy and unable to latch without the shield with  assistance.  Mom to practice putting baby in skin the scanning offering self attachment if possible.  Will continue to wean from the shield mom to follow-up on Wednesday, July 17.    LACTATION PROBLEMS AND STRATEGIES:  Problems latching baby to breast: Baby should latch to areola (dark area) not just the nipple.  Baby's lips should be flanged outward.  Bring the baby up to the level of your breast by putting a pillow under the baby.  Do not use bottles or pacifiers until latching on well.  Dribble milk over the nipple or express milk so that baby can taste it  Encourage a deeper latch with the asymetrical latch- lining baby's nose opposite mother's nipple.  Encourage nipple to stand up prior to feeing by pumping, nipple rolling or by brief application of ice.  Gently tickle your baby's lip with your nipple to encourage your baby to open his/her mouth wide.  Hold baby so that your breast is positioned deep in the baby's mouth.  Hold your baby close, tummy to tummy.  If baby does not latch to breast, express breast milk.  If engorged, express some milk to soften breast.  If the baby is not latched on well, break seal and gently try again.  Keep baby skin to skin and watch for feeding cues.  Massage breast to start milk-ejection reflex.  Place nipple and at least 1 inch of areola into baby's mouth.  Place your hand behind the baby's neck and shoulder.  Do not force baby's head into breast.  Put baby in the football hold or clutch hold, supporting his/her neck and head in sniffing position to open his/her throat.  Shape breast into oval shape (sandwich hold)  for deep latch.  Try different feeding positions (cradle, football, side etc.).  Use a breast feeding pillow to bring baby up to the level of the breast.  Use nipple shield and monitor baby's weight gain and output.  Use semi-reclined feeding position to allow baby to take an active role and trigger baby's inborn feeding behavior.  Use your hand to support your  breast during feeding.  When your baby's mouth is wide open, quickly pull baby into your breast.  Your baby's chin should be pressed into your breast.  PATIENT INSTRUCTION HANDOUTS GIVEN:      LACTATION EDUCATION:  Correct Positioning and Correct Latch On

## 2024-07-16 NOTE — PATIENT INSTRUCTIONS
Mom here for follow-up weight check and assistance nursing without a shield.  Baby up 4 ounces in 3 days.  Mom feels baby is nursing very well denies pain or problems baby quickly transferred 2.8 ounces at the breast using the shield.  Baby was very fussy and unable to latch without the shield with assistance.  Mom to practice putting baby in skin the scanning offering self attachment if possible.  Will continue to wean from the shield mom to follow-up on Wednesday, July 17.

## 2024-07-17 ENCOUNTER — APPOINTMENT (OUTPATIENT)
Dept: LACTATION | Facility: CLINIC | Age: 29
End: 2024-07-17
Payer: COMMERCIAL

## 2024-07-18 ENCOUNTER — LACTATION CONSULT (OUTPATIENT)
Dept: LACTATION | Facility: CLINIC | Age: 29
End: 2024-07-18
Payer: COMMERCIAL

## 2024-07-18 ENCOUNTER — APPOINTMENT (OUTPATIENT)
Dept: RADIOLOGY | Facility: CLINIC | Age: 29
End: 2024-07-18
Payer: COMMERCIAL

## 2024-07-18 DIAGNOSIS — O92.70 LACTATION PROBLEM (HHS-HCC): Primary | ICD-10-CM

## 2024-07-18 PROCEDURE — 99211 OFF/OP EST MAY X REQ PHY/QHP: CPT | Performed by: EMERGENCY MEDICINE

## 2024-07-18 NOTE — PROGRESS NOTES
Lactation Counseling Note    Subjective:    Snehal Ball is a 29 y.o. patient referred for lactation counseling. She is here today due to Latch issues. She was referred by her  sister who was patient here .     OB HISTORY:   /Para: : 1  Para: 1  Weeks Gestation: 37 wks    Objective:    BREASTFEEDING ASSESSMENT:   Physical Exam    Breast Assessment: Large, Pendulous, Full, Compressible, and Readiness to feed  Nipple Assessment/Stage: intact, short, erect, and rounded after feeding none  Areola: Normal  Feeding Position: baby - led, breast sandwich, c - hold, cross - cradle, laid back, skin to skin, both sides, switch nursing, nipple to nose, mother needs assistance with latch/positioning, and baby's head too high over breast  Latch: minimal assistance is needed, eagerly grasped on to latch, latch achieved, comfortable with no pain, sucking and swallowing, sucks with long jaw movement, upper lip turned in, lower lip turned in, and frequent audible swallows  Suck/Feeding: sustained, coordinated suck/swallow/breathe, baby led rhythmically, audible swallowing, audible swallowing with stimulation, nipple shield used, and content after feeding  Infant Behavior: awake, quiet alert, readiness to feed, feeding cues observed, rooting response, and content after feeding  Weight: Weight before feedin lbs 11.3 oz  Weight after feedin lb 14.4 oz  Amount of breast milk transferred: 3.1 oz ml  Number of voids in the last 24 hours: 8  Number of stools in the last 24 hours: 6-8    PATIENT DISCUSSION SUMMARY: Mom and baby Cassi here for continued follow up and assistance to wean from shiewld. Infant weighed prior to feed at 6 lb 11.3 oz which is a gain of 3.6 oz in 3 days. Mom very pleased with progress. Mom independently latched infant to right, more difficult, side first with shield. Assisted to flange lips. Good rhythmic sucks and swallows. After several minutes of good swallows, shield was removed and  "attempts made to latch without shield. Infant would initially latch well with good sucks and swallows while breast held and \"sandwiched\" for baby, but then baby would pull back and be shallow at the breast and sleepy. Not able to sustain latch without shield yet. Shield applied back in place and infant immediately began with many sucks and swallows. Reweighed after first side and transferred 0.9 oz per test weight. Attempted to latch without shield on left \"favorite side with more supply. Infant again would latch well with swallows initially, but then slip back and not sustain deep latch. Infant weighed to assess transfer without shield, infant only transferred 0.2 oz after multiple attempts. Shield placed once again and infant transferred an additional 1.2 oz. Discussed how infant appears to possibly have \"tight\" jaw and that maybe it is uncomfortable for her to maintain a wide gape necessary for deeper latch without shield. Discussed speaking with pediatrician for possible referral to OT or chiropractor that may help loosen the tension. Aftercare stretches done per request and appear to be healing appropriately. Infant then latched to \"third\" breast with shield for an additional 0.8 oz. Total for this feed all together = 3.1 oz. Infant content after feed. Mom would like to return Tuesday 7-23-24 for continued assistance.     LACTATION PROBLEMS AND STRATEGIES:  Problems latching baby to breast: Baby should latch to areola (dark area) not just the nipple.  Baby's lips should be flanged outward.  Bring the baby up to the level of your breast by putting a pillow under the baby.  Do not use bottles or pacifiers until latching on well.  Dribble milk over the nipple or express milk so that baby can taste it  Encourage a deeper latch with the asymetrical latch- lining baby's nose opposite mother's nipple.  Encourage nipple to stand up prior to feeing by pumping, nipple rolling or by brief application of ice.  Gently " tickle your baby's lip with your nipple to encourage your baby to open his/her mouth wide.  Hold baby so that your breast is positioned deep in the baby's mouth.  Hold your baby close, tummy to tummy.  If baby does not latch to breast, express breast milk.  If engorged, express some milk to soften breast.  If the baby is not latched on well, break seal and gently try again.  Keep baby skin to skin and watch for feeding cues.  Massage breast to start milk-ejection reflex.  Place nipple and at least 1 inch of areola into baby's mouth.  Place your hand behind the baby's neck and shoulder.  Do not force baby's head into breast.  Put baby in the football hold or clutch hold, supporting his/her neck and head in sniffing position to open his/her throat.  Shape breast into oval shape (sandwich hold)  for deep latch.  Try different feeding positions (cradle, football, side etc.).  Use a breast feeding pillow to bring baby up to the level of the breast.  Use nipple shield and monitor baby's weight gain and output.  Use semi-reclined feeding position to allow baby to take an active role and trigger baby's inborn feeding behavior.  Use your hand to support your breast during feeding.  When your baby's mouth is wide open, quickly pull baby into your breast.  Your baby's chin should be pressed into your breast.  PATIENT INSTRUCTION HANDOUTS GIVEN:      LACTATION EDUCATION:  Correct Positioning and Correct Latch On

## 2024-07-18 NOTE — PATIENT INSTRUCTIONS
"Mom and baby Cassi here for continued follow up and assistance to wean from shiewld. Infant weighed prior to feed at 6 lb 11.3 oz which is a gain of 3.6 oz in 3 days. Mom very pleased with progress. Mom independently latched infant to right, more difficult, side first with shield. Assisted to flange lips. Good rhythmic sucks and swallows. After several minutes of good swallows, shield was removed and attempts made to latch without shield. Infant would initially latch well with good sucks and swallows while breast held and \"sandwiched\" for baby, but then baby would pull back and be shallow at the breast and sleepy. Not able to sustain latch without shield yet. Shield applied back in place and infant immediately began with many sucks and swallows. Reweighed after first side and transferred 0.9 oz per test weight. Attempted to latch without shield on left \"favorite side with more supply. Infant again would latch well with swallows initially, but then slip back and not sustain deep latch. Infant weighed to assess transfer without shield, infant only transferred 0.2 oz after multiple attempts. Shield placed once again and infant transferred an additional 1.2 oz. Discussed how infant appears to possibly have \"tight\" jaw and that maybe it is uncomfortable for her to maintain a wide gape necessary for deeper latch without shield. Discussed speaking with pediatrician for possible referral to OT or chiropractor that may help loosen the tension. Aftercare stretches done per request and appear to be healing appropriately. Infant then latched to \"third\" breast with shield for an additional 0.8 oz. Total for this feed all together = 3.1 oz. Infant content after feed. Mom would like to return Tuesday 7-23-24 for continued assistance.   "

## 2024-07-23 ENCOUNTER — LACTATION CONSULT (OUTPATIENT)
Dept: LACTATION | Facility: CLINIC | Age: 29
End: 2024-07-23
Payer: COMMERCIAL

## 2024-07-23 DIAGNOSIS — O92.70 LACTATION PROBLEM (HHS-HCC): Primary | ICD-10-CM

## 2024-07-23 PROCEDURE — 99211 OFF/OP EST MAY X REQ PHY/QHP: CPT | Performed by: EMERGENCY MEDICINE

## 2024-07-23 NOTE — PROGRESS NOTES
Lactation Counseling Note    Subjective:    Snehal Ball is a 29 y.o. patient referred for lactation counseling. She is here today due to Latch issues. She was referred by her  sister .     OB HISTORY:   Mode of Delivery: Primary  section    Objective:    BREASTFEEDING ASSESSMENT:   Physical Exam    Breast Assessment: Medium, Symmetrical, Full, Soft, Warm, and Compressible  Nipple Assessment/Stage: intact, short, and rounded after feeding Stage I - Pain or irritation with no skin breakdown  Areola: Normal  Feeding Position: c - hold, cradle, skin to skin, both sides, switch nursing, and mother demonstrates good positioning  Latch: eagerly grasped on to latch, deep latch obtained, comfortable with no pain, bursts of sucking, swallowing, and rest, flanged lips, and comfortable latch  Suck/Feeding: sustained, audible swallowing, and nipple shield used  Alternative Feeding Methods: nursing at the breast  Infant Behavior: awake, active alert, fussy, readiness to feed, feeding cues observed, and content after feeding  Infant Information: Post status frenotomy  Receding chin  Good cupping of tongue  Good lateral movement of the tongue  Able to elevate tongue to roof of mouth  Nipple Pain: Pain scale 0-10 (10 most pain): 0  Weight: Weight before feedinlbs14.6  Weight after feedinlbs 1.9oz  Amount of breast milk transferred: 3.3oz ml  Number of voids in the last 24 hours: 8  Number of stools in the last 24 hours: 4    PATIENT DISCUSSION SUMMARY: Mom here for follow-upWeight check and assistance weaning from shield baby up 3.3 ounces in 5 days mom states baby nursing very well with the shield not supplementing and pumping to empty breasts twice a day.  Assisted mom with nurse and nursing without the shield baby latched well onto breast stayed for about 10 to 15 minutes mom states this is a lot shorter and baby did transfer 1 ounce.  Mom felt baby needed more put baby on the shield and baby did transfer  another 2.3 ounces which she will.  Explained to mom baby would be frustrated as she wean from shield but when she was ready probably would go pretty well.  Mom states she is going to try a little harder at home nursing without the shield and then follow-up in a couple of days for a weight check.  Mom to return Thursday, July 25 for more assistance in weaning from shield.    LACTATION PROBLEMS AND STRATEGIES:  Problems latching baby to breast: Baby should latch to areola (dark area) not just the nipple.  Baby's lips should be flanged outward.  Bring the baby up to the level of your breast by putting a pillow under the baby.  Do not use bottles or pacifiers until latching on well.  Dribble milk over the nipple or express milk so that baby can taste it  Encourage a deeper latch with the asymetrical latch- lining baby's nose opposite mother's nipple.  Encourage nipple to stand up prior to feeing by pumping, nipple rolling or by brief application of ice.  Gently tickle your baby's lip with your nipple to encourage your baby to open his/her mouth wide.  Hold baby so that your breast is positioned deep in the baby's mouth.  Hold your baby close, tummy to tummy.  If baby does not latch to breast, express breast milk.  If engorged, express some milk to soften breast.  If the baby is not latched on well, break seal and gently try again.  Keep baby skin to skin and watch for feeding cues.  Massage breast to start milk-ejection reflex.  Place nipple and at least 1 inch of areola into baby's mouth.  Place your hand behind the baby's neck and shoulder.  Do not force baby's head into breast.  Put baby in the football hold or clutch hold, supporting his/her neck and head in sniffing position to open his/her throat.  Shape breast into oval shape (sandwich hold)  for deep latch.  Try different feeding positions (cradle, football, side etc.).  Use a breast feeding pillow to bring baby up to the level of the breast.  Use nipple shield  and monitor baby's weight gain and output.  Use semi-reclined feeding position to allow baby to take an active role and trigger baby's inborn feeding behavior.  Use your hand to support your breast during feeding.  When your baby's mouth is wide open, quickly pull baby into your breast.  Your baby's chin should be pressed into your breast.  PATIENT INSTRUCTION HANDOUTS GIVEN:      LACTATION EDUCATION:  Correct Positioning and Correct Latch On

## 2024-07-25 ENCOUNTER — LACTATION CONSULT (OUTPATIENT)
Dept: LACTATION | Facility: CLINIC | Age: 29
End: 2024-07-25
Payer: COMMERCIAL

## 2024-07-25 DIAGNOSIS — O92.70 LACTATION PROBLEM (HHS-HCC): Primary | ICD-10-CM

## 2024-07-25 PROCEDURE — 99211 OFF/OP EST MAY X REQ PHY/QHP: CPT | Performed by: EMERGENCY MEDICINE

## 2024-07-25 NOTE — PATIENT INSTRUCTIONS
"Mom and baby here for continued follow up and help to wean off use of nipple shield. Mom states infant still \"nurses with shield more times than not\" at home. Infant weighed prior to feed at 7 lbs. Even. Mom was very pleased as this was her goal. Baby gained 1.4 ozs. In 2 days which is very good gain for 4 week old. Mom independently latched infant well to right breast without use of shield. Good long rhythmic sucks observed with wide gape and many audible swallows. Infant weighed after first side and transferred 1.7 oz per test weight in short amount of time at breast. Discussed how infant's suck is becoming more efficient so she is able to transfer more in shorter amount of time. Mom latched infant without shield to second side. Infant latched readily and again began with rhythmic sucks and swallows. Infant reweighed and transferred an additional 1.1 oz. For a total of 2.8 oz this feeding. Infant held skin to skin, began rooting slightly and latched again to breast without shield. Infant fell asleep after only some effective sucks and swallows. Transferred 0.3 oz in addition for a grand total of 3.1 oz. Mom very pleased and reassured. Encouraged mom to begin each feeding without the shield and only use if infant unable to latch well and/or becoming frustrated. Plans return here next week on Tuesday 7-30-24 for further follow up.   "

## 2024-07-25 NOTE — PROGRESS NOTES
"Lactation Counseling Note    Subjective:    Snehal Ball is a 29 y.o. patient referred for lactation counseling. She is here today due to Latch issues. She was referred by her  sister .     OB HISTORY:   /Para: : 1  Para: 1    Objective:    BREASTFEEDING ASSESSMENT:   Physical Exam    Breast Assessment: Large, Pendulous, Full, Compressible, and Readiness to feed  Nipple Assessment/Stage: intact, short, erect with stimulation, and rounded after feeding none  Areola: Normal  Feeding Position: baby - led, c - hold, cross - cradle, laid back, skin to skin, both sides, switch nursing, nipple to nose, and mother demonstrates good positioning  Latch: eagerly grasped on to latch, areolar attachment, deep latch obtained, optimal angle of mouth opening, comfortable with no pain, sucking and swallowing, sucks with long jaw movement, chin and lower lip contact breast first, flanged lips, chin moves in rhythmic motion, comfortable latch, and frequent audible swallows  Suck/Feeding: sustained, coordinated suck/swallow/breathe, baby led rhythmically, audible swallowing, audible swallowing with stimulation, and content after feeding  Breast Pain: none  Nipple Pain: none  Weight: Weight before feedin lb 0.0 oz  Weight after feedin lb 3.1 oz  Amount of breast milk transferred: 3.1 oz ml    PATIENT DISCUSSION SUMMARY: Mom and baby here for continued follow up and help to wean off use of nipple shield. Mom states infant still \"nurses with shield more times than not\" at home. Infant weighed prior to feed at 7 lbs. Even. Mom was very pleased as this was her goal. Baby gained 1.4 ozs. In 2 days which is very good gain for 4 week old. Mom independently latched infant well to right breast without use of shield. Good long rhythmic sucks observed with wide gape and many audible swallows. Infant weighed after first side and transferred 1.7 oz per test weight in short amount of time at breast. Discussed how infant's " suck is becoming more efficient so she is able to transfer more in shorter amount of time. Mom latched infant without shield to second side. Infant latched readily  and again began with rhythmic sucks and swallows. Infant reweighed and transferred an additional 1.1 oz. For a total of 2.8 oz this feeding. Infant held skin to skin, began rooting slightly and latched again to breast without shield. Infant fell asleep after only some effective sucks and swallows. Transferred 0.3 oz in addition for a grand total of 3.1 oz. Mom very pleased and reassured. Encouraged mom to begin each feeding without the shield and only use if infant unable to latch well and/or becoming frustrated. Plans return here next week on Tuesday 7-30-24 for further follow up.     LACTATION PROBLEMS AND STRATEGIES:  Problems latching baby to breast: Baby should latch to areola (dark area) not just the nipple.  Baby's lips should be flanged outward.  Bring the baby up to the level of your breast by putting a pillow under the baby.  Do not use bottles or pacifiers until latching on well.  Dribble milk over the nipple or express milk so that baby can taste it  Encourage a deeper latch with the asymetrical latch- lining baby's nose opposite mother's nipple.  Encourage nipple to stand up prior to feeing by pumping, nipple rolling or by brief application of ice.  Gently tickle your baby's lip with your nipple to encourage your baby to open his/her mouth wide.  Hold baby so that your breast is positioned deep in the baby's mouth.  Hold your baby close, tummy to tummy.  If baby does not latch to breast, express breast milk.  If engorged, express some milk to soften breast.  If the baby is not latched on well, break seal and gently try again.  Keep baby skin to skin and watch for feeding cues.  Massage breast to start milk-ejection reflex.  Place nipple and at least 1 inch of areola into baby's mouth.  Place your hand behind the baby's neck and shoulder.  Do  not force baby's head into breast.  Put baby in the football hold or clutch hold, supporting his/her neck and head in sniffing position to open his/her throat.  Shape breast into oval shape (sandwich hold)  for deep latch.  Try different feeding positions (cradle, football, side etc.).  Use a breast feeding pillow to bring baby up to the level of the breast.  Use nipple shield and monitor baby's weight gain and output.  Use semi-reclined feeding position to allow baby to take an active role and trigger baby's inborn feeding behavior.  Use your hand to support your breast during feeding.  When your baby's mouth is wide open, quickly pull baby into your breast.  Your baby's chin should be pressed into your breast.  PATIENT INSTRUCTION HANDOUTS GIVEN:      LACTATION EDUCATION:  Correct Positioning and Correct Latch On

## 2024-07-30 ENCOUNTER — LACTATION CONSULT (OUTPATIENT)
Dept: LACTATION | Facility: CLINIC | Age: 29
End: 2024-07-30
Payer: COMMERCIAL

## 2024-07-30 PROCEDURE — 99211 OFF/OP EST MAY X REQ PHY/QHP: CPT | Performed by: EMERGENCY MEDICINE

## 2024-07-31 NOTE — PROGRESS NOTES
Lactation Counseling Note    Subjective:    Snehal Ball is a 29 y.o. patient referred for lactation counseling. She is here today due to  wt check . She was referred by her  her sister .     OB HISTORY:   Mode of Delivery: Primary  section    Objective:    BREASTFEEDING ASSESSMENT:   Physical Exam    Breast Assessment: Medium, Asymmetrical, Full, Soft, Warm, Compressible, and Readiness to feed  Nipple Assessment/Stage: short and rounded after feeding    Areola: Normal  Feeding Position: cradle, skin to skin, both sides, and mother demonstrates good positioning  Latch: latch achieved, chin and lower lip contact breast first, flanged lips, comfortable latch, correct tongue position, and frequent audible swallows  Suck/Feeding: sustained, audible swallowing, and content after feeding  Alternative Feeding Methods: nursing at the breast  Infant Feeding Method: Breast milk only  Infant Behavior: awake, active alert, fussy, feeding cues observed, and content after feeding  Nipple Pain: Pain scale 0-10 (10 most pain): 0  Weight: Weight before feedinlbs 4.5oz  Weight after feedinlbs 8.1oz  Amount of breast milk transferred: 3.6oz ml    PATIENT DISCUSSION SUMMARY: Patient here for follow-up weight check baby up 5 ounces in 5 days mom states she has not used the shield for the last 5 days denies any pain or problems baby then proceeded to transfer 3.6 ounces in the short amount of time.  Baby doing well mom to return as needed.    LACTATION PROBLEMS AND STRATEGIES:     PATIENT INSTRUCTION HANDOUTS GIVEN:      LACTATION EDUCATION:  Correct Positioning and Correct Latch On

## 2024-08-06 ENCOUNTER — APPOINTMENT (OUTPATIENT)
Dept: LACTATION | Facility: CLINIC | Age: 29
End: 2024-08-06
Payer: COMMERCIAL

## 2024-08-13 ENCOUNTER — APPOINTMENT (OUTPATIENT)
Dept: LACTATION | Facility: CLINIC | Age: 29
End: 2024-08-13
Payer: COMMERCIAL

## 2024-08-13 NOTE — PROGRESS NOTES
"Patient presents for her 7 week Postpartum Visit.  Delivered LTCS 2024 for PROM  EPDS = 6  Pap: 2022 nml  LMP: Absent   Breastfeeding: Yes  Birth Control: Tracking cycles   C/O: None     Sara Ch MA II    Postpartum Visit    HPI:  Pt presents for her 6 week postpartum visit s/p  section for breech in setting of IUGR and PROM on  without complications.  She had a baby girl.  She is Breast feeding which is going very well overall.  She has no complaints today.     ROS:  Denies the following: Complains of the following:    - episiotomy site pain   - incisional pain  - incisional drainage  - heavy bleeding  - irregular bleeding  - breast pain  - cracked nipples  - breastfeeding problems  - abdominal pain  - vaginal discharge  - shortness of breath  - chest pain  - back pain  - leg pain  - depression  - suicidal ideation  - nausea  - vomiting  - fever  - pain with urination  - tiredness or fatigue  - headache no pertinent positives        O:  /78 (BP Location: Right arm, Patient Position: Sitting)   Pulse 63   Ht 1.626 m (5' 4\")   Wt 73 kg (161 lb)   LMP  (LMP Unknown)   SpO2 97%   Breastfeeding Yes   BMI 27.64 kg/m²     General Appearance   - consistent with stated age, well groomed and cooperative    Integumentary  - skin warm and dry without rash    Head and Neck  - normalocephalic and neck supple    Chest and Lung Exam  - normal breathing effort, no respiratory distress    Abdomen  - soft, nontender, nondistended, well-healed Pfannenstiel skin incision    Peripheral Vascular  - no edema present    A/P:   Pt is a 29 y.o.  who presents for 6 week postpartum visit s/p primary  section.  Doing well overall postpartum.  Coping well with new baby at home.  [place]  Depression Scale Total: 6.  Breastfeeding going well.  Baby doing well.   Pap UTD and not indicated.  Contraception discussed - plans to use withdrawal/cycle timing to avoid pregnancy for now.  " F/u as needed or for annual exam.

## 2024-08-14 ENCOUNTER — APPOINTMENT (OUTPATIENT)
Dept: OBSTETRICS AND GYNECOLOGY | Facility: CLINIC | Age: 29
End: 2024-08-14
Payer: COMMERCIAL

## 2024-08-14 VITALS
HEIGHT: 64 IN | DIASTOLIC BLOOD PRESSURE: 78 MMHG | WEIGHT: 161 LBS | OXYGEN SATURATION: 97 % | BODY MASS INDEX: 27.49 KG/M2 | SYSTOLIC BLOOD PRESSURE: 116 MMHG | HEART RATE: 63 BPM

## 2024-08-14 PROCEDURE — 0503F POSTPARTUM CARE VISIT: CPT | Performed by: OBSTETRICS & GYNECOLOGY

## 2024-08-14 ASSESSMENT — EDINBURGH POSTNATAL DEPRESSION SCALE (EPDS)
I HAVE FELT SCARED OR PANICKY FOR NO GOOD REASON: NO, NOT MUCH
I HAVE FELT SAD OR MISERABLE: NO, NOT AT ALL
I HAVE BEEN SO UNHAPPY THAT I HAVE HAD DIFFICULTY SLEEPING: NOT AT ALL
THINGS HAVE BEEN GETTING ON TOP OF ME: NO, MOST OF THE TIME I HAVE COPED QUITE WELL
I HAVE BLAMED MYSELF UNNECESSARILY WHEN THINGS WENT WRONG: YES, SOME OF THE TIME
TOTAL SCORE: 6
THE THOUGHT OF HARMING MYSELF HAS OCCURRED TO ME: NEVER
I HAVE BEEN ANXIOUS OR WORRIED FOR NO GOOD REASON: YES, SOMETIMES
I HAVE LOOKED FORWARD WITH ENJOYMENT TO THINGS: AS MUCH AS I EVER DID
I HAVE BEEN ABLE TO LAUGH AND SEE THE FUNNY SIDE OF THINGS: AS MUCH AS I ALWAYS COULD
I HAVE BEEN SO UNHAPPY THAT I HAVE BEEN CRYING: NO, NEVER

## 2024-08-14 ASSESSMENT — PAIN SCALES - GENERAL: PAINLEVEL: 0-NO PAIN

## 2024-09-12 DIAGNOSIS — L03.311 CELLULITIS OF ABDOMINAL WALL: Primary | ICD-10-CM

## 2024-09-12 RX ORDER — CEPHALEXIN 500 MG/1
500 CAPSULE ORAL 4 TIMES DAILY
Qty: 28 CAPSULE | Refills: 0 | Status: SHIPPED | OUTPATIENT
Start: 2024-09-12 | End: 2024-09-19

## 2025-04-07 ENCOUNTER — APPOINTMENT (OUTPATIENT)
Dept: OBSTETRICS AND GYNECOLOGY | Facility: CLINIC | Age: 30
End: 2025-04-07
Payer: COMMERCIAL

## 2025-04-07 VITALS — BODY MASS INDEX: 24.61 KG/M2 | DIASTOLIC BLOOD PRESSURE: 76 MMHG | WEIGHT: 143.4 LBS | SYSTOLIC BLOOD PRESSURE: 120 MMHG

## 2025-04-07 DIAGNOSIS — Z3A.01 6 WEEKS GESTATION OF PREGNANCY (HHS-HCC): Primary | ICD-10-CM

## 2025-04-07 DIAGNOSIS — Z34.90 PRENATAL CARE, ANTEPARTUM, UNSPECIFIED GRAVIDITY: ICD-10-CM

## 2025-04-07 DIAGNOSIS — O09.291: ICD-10-CM

## 2025-04-07 DIAGNOSIS — Z98.891 HISTORY OF CESAREAN DELIVERY: ICD-10-CM

## 2025-04-07 PROCEDURE — 0500F INITIAL PRENATAL CARE VISIT: CPT | Performed by: OBSTETRICS & GYNECOLOGY

## 2025-04-07 ASSESSMENT — EDINBURGH POSTNATAL DEPRESSION SCALE (EPDS)
I HAVE LOOKED FORWARD WITH ENJOYMENT TO THINGS: AS MUCH AS I EVER DID
THINGS HAVE BEEN GETTING ON TOP OF ME: NO, MOST OF THE TIME I HAVE COPED QUITE WELL
I HAVE BEEN SO UNHAPPY THAT I HAVE BEEN CRYING: NO, NEVER
I HAVE BLAMED MYSELF UNNECESSARILY WHEN THINGS WENT WRONG: NOT VERY OFTEN
TOTAL SCORE: 4
THE THOUGHT OF HARMING MYSELF HAS OCCURRED TO ME: NEVER
I HAVE BEEN ANXIOUS OR WORRIED FOR NO GOOD REASON: HARDLY EVER
I HAVE BEEN SO UNHAPPY THAT I HAVE HAD DIFFICULTY SLEEPING: NOT AT ALL
I HAVE FELT SAD OR MISERABLE: NO, NOT AT ALL
I HAVE FELT SCARED OR PANICKY FOR NO GOOD REASON: NO, NOT MUCH
I HAVE BEEN ABLE TO LAUGH AND SEE THE FUNNY SIDE OF THINGS: AS MUCH AS I ALWAYS COULD

## 2025-04-07 NOTE — PROGRESS NOTES
Patient presents for initial OB visit  Urine culture, GC/CT & Pap at N/V  Pap: 2022 nml  LMP: 2025  EPDS = 4  C/O: Nausea     Sara Ch MA II    Routine prenatal visit     Subjective    HPI:  30 y.o.  at 8.4 weeks gestational age by sure LMP.  Well known to me from prior pregnancy.  Planned and desired pregnancy.  Has long and irregular cycles.  USN done today - CRL NOT c/w LMP dating.  EDC per USN today at 6w5d - EDC .  PMH uncomplicated.  OB history significant for IUGR and ultimately delivery at 37.2 via c/s for breech. Given 17 month interdelivery interval, not candidate for TOLAC at [facility].  Pt is not interested in TOLAC and wants scheduled RCS.    Has nausea but tolerating.   Taking OTC PNV.     BMI today Body mass index is 24.61 kg/m². Discussed optimal weight gain in pregnancy.   Pt aware of collaborative care model and group practice from last pregnancy. Questions answered.      Objective    Vital Signs  /76   Wt 65 kg (143 lb 6.4 oz)   LMP 2025   BMI 24.61 kg/m²     Snehal Ball is a 30 y.o. yo  at 6w5d here for the following concerns which we addressed today:     Medical Problems       Problem List       6 weeks gestation of pregnancy ([facility])    Overview Signed 2025  2:17 PM by Libia Negrete MD     Desired provider in labor: [] CNM  [x] Physician   [] Either Acceptable  [x] Blood Products: [x] Yes, accepts [] No, needs counseling  [x] Initial BMI: 24.02   [] Prenatal Labs:   [] Cervical Cancer Screening up to date  [x] Rh status: negative --> Rhogam at 28 weeks   [x] Screen for IPV and Substance Use Risk  [] Genetic Screening (cfDNA):    [] First Trimester Anatomy Screen (11-13.6 wks): <> discuss next visit   [] Baby ASA: <> discuss/start next visit   [x] Pregnancy dated by: 6 week USN in office (not c/w LMP)    [] Anatomy US: (19-20 wks)  [] Federal Sterilization consent signed (if indicated):  [] 1hr GCT at 24-28wks:  [] Rhogam (if  indicated):   [] Fetal Surveillance (if indicated):  [] Tdap (27-32 wks, may be given up to 36 wks if initial window missed):   [] RSV (32-36 wks) (Sept. to end ):     [] Feeding Intentions:  [] Postpartum Birth control method:   [] GBS at 36 - 37 wks:  [x] 39 weeks discussion of IOL vs. Expectant management: RCS at 39 weeks   [x] Mode of delivery ( anticipated ): RCS            History of  delivery    Overview Signed 2025  2:19 PM by Libia Negrete MD     - c/s for breech and growth restriction at 37.3 in 2024   - [facility] 70.9%  - 17 month interdelivery interval --> not candidate for TOLAC at [facility]   - wants RCS  <> schedule RCS              Follow up in 3 week(s).

## 2025-04-29 ENCOUNTER — APPOINTMENT (OUTPATIENT)
Dept: OBSTETRICS AND GYNECOLOGY | Facility: CLINIC | Age: 30
End: 2025-04-29
Payer: COMMERCIAL

## 2025-04-29 VITALS — WEIGHT: 145.2 LBS | SYSTOLIC BLOOD PRESSURE: 124 MMHG | BODY MASS INDEX: 24.92 KG/M2 | DIASTOLIC BLOOD PRESSURE: 70 MMHG

## 2025-04-29 DIAGNOSIS — Z98.891 HISTORY OF CESAREAN DELIVERY: ICD-10-CM

## 2025-04-29 DIAGNOSIS — Z3A.09 9 WEEKS GESTATION OF PREGNANCY (HHS-HCC): Primary | ICD-10-CM

## 2025-04-29 DIAGNOSIS — O09.291: ICD-10-CM

## 2025-04-29 DIAGNOSIS — Z34.80 SUPERVISION OF OTHER NORMAL PREGNANCY, ANTEPARTUM (HHS-HCC): ICD-10-CM

## 2025-04-29 PROCEDURE — 0501F PRENATAL FLOW SHEET: CPT | Performed by: OBSTETRICS & GYNECOLOGY

## 2025-04-29 NOTE — PROGRESS NOTES
Routine prenatal visit     Subjective    HPI:  Feeling more nauseous but coping OK.  Declines medication.  13 week USN ordered and she will schedule.  Routine labs and cfDNA screening ordered and she will got to  lab prior to next visit.  Discussed ASA prophylaxis - pt declines.  OB physical done today - GC/CT sent.  Pap due in 2025 - plan to do at postpartum visit.     Objective    Vital Signs  /70   Wt 65.9 kg (145 lb 3.2 oz)   LMP 2025   BMI 24.92 kg/m²     Snehal Ball is a 30 y.o. yo  at 9w6d here for the following concerns which we addressed today:     Medical Problems       Problem List       9 weeks gestation of pregnancy ([facility])    Overview Addendum 2025  8:35 AM by Libia Negrete MD   Desired provider in labor: [] CNM  [x] Physician   [] Either Acceptable  [x] Blood Products: [x] Yes, accepts [] No, needs counseling  [x] Initial BMI: 24.02   [] Prenatal Labs: <> ordered and she will get done at  lab prior to next appt   [x] Cervical Cancer Screening up to date: 2022 - normal - plan pap at postpartum visit   [x] Rh status: negative --> Rhogam at 28 weeks   [x] Screen for IPV and Substance Use Risk  [] Genetic Screening (cfDNA):  <> cfDNA screening ordered and she will get done at  lab   [] First Trimester Anatomy Screen (11-13.6 wks): <> discuss next visit   [] Baby ASA: <> discuss/start next visit   [x] Pregnancy dated by: 6 week USN in office (not c/w LMP)    [] Anatomy US: (19-20 wks)  [] Federal Sterilization consent signed (if indicated):  [] 1hr GCT at 24-28wks:  [] Rhogam (if indicated):   [] Fetal Surveillance (if indicated):  [] Tdap (27-32 wks, may be given up to 36 wks if initial window missed):   [] RSV (32-36 wks) (Sept. to end of ):     [] Feeding Intentions:  [] Postpartum Birth control method:   [] GBS at 36 - 37 wks:  [x] 39 weeks discussion of IOL vs. Expectant management: RCS at 39 weeks   [x] Mode of delivery ( anticipated ): RCS             History of  delivery    Overview Signed 2025  2:19 PM by Libia Negrete MD   - c/s for breech and growth restriction at 37.3 in 2024   - [facility] 70.9%  - 17 month interdelivery interval --> not candidate for TOLAC at [facility]   - wants RCS  <> schedule RCS          History of intrauterine growth restriction in prior pregnancy, currently pregnant, first trimester ([facility])    Overview Signed 2025  2:22 PM by Libia Negrete MD   <> growth USN              Follow up in 4 week(s).

## 2025-04-30 ENCOUNTER — APPOINTMENT (OUTPATIENT)
Dept: OBSTETRICS AND GYNECOLOGY | Facility: CLINIC | Age: 30
End: 2025-04-30
Payer: COMMERCIAL

## 2025-04-30 ENCOUNTER — LAB (OUTPATIENT)
Dept: LAB | Facility: HOSPITAL | Age: 30
End: 2025-04-30
Payer: COMMERCIAL

## 2025-04-30 LAB
ERYTHROCYTE [DISTWIDTH] IN BLOOD BY AUTOMATED COUNT: 13 % (ref 11.5–14.5)
HCT VFR BLD AUTO: 38.2 % (ref 36–46)
HGB BLD-MCNC: 13.2 G/DL (ref 12–16)
MCH RBC QN AUTO: 29.5 PG (ref 26–34)
MCHC RBC AUTO-ENTMCNC: 34.6 G/DL (ref 32–36)
MCV RBC AUTO: 86 FL (ref 80–100)
NRBC BLD-RTO: 0 /100 WBCS (ref 0–0)
PLATELET # BLD AUTO: 248 X10*3/UL (ref 150–450)
RBC # BLD AUTO: 4.47 X10*6/UL (ref 4–5.2)
REFLEX ADDED, ANEMIA PANEL: NORMAL
WBC # BLD AUTO: 6.7 X10*3/UL (ref 4.4–11.3)

## 2025-04-30 PROCEDURE — 86900 BLOOD TYPING SEROLOGIC ABO: CPT

## 2025-04-30 PROCEDURE — 85027 COMPLETE CBC AUTOMATED: CPT

## 2025-04-30 PROCEDURE — 86850 RBC ANTIBODY SCREEN: CPT

## 2025-04-30 PROCEDURE — 86901 BLOOD TYPING SEROLOGIC RH(D): CPT

## 2025-05-01 LAB
BACTERIA UR CULT: NORMAL
C TRACH RRNA SPEC QL NAA+PROBE: NOT DETECTED
EST. AVERAGE GLUCOSE BLD GHB EST-MCNC: 111 MG/DL
EST. AVERAGE GLUCOSE BLD GHB EST-SCNC: 6.2 MMOL/L
HBA1C MFR BLD: 5.5 %
HBV SURFACE AG SERPL QL IA: NORMAL
HCV AB SERPL QL IA: NORMAL
HIV 1+2 AB+HIV1 P24 AG SERPL QL IA: NORMAL
N GONORRHOEA RRNA SPEC QL NAA+PROBE: NOT DETECTED
QUEST GC CT AMPLIFIED (ALWAYS MESSAGE): NORMAL
RUBV IGG SERPL IA-ACNC: 1.09 INDEX
T PALLIDUM AB SER QL IA: NORMAL

## 2025-05-02 LAB
ABO GROUP (TYPE) IN BLOOD: NORMAL
ANTIBODY SCREEN: NORMAL
RH FACTOR (ANTIGEN D): NORMAL

## 2025-05-05 LAB
EST. AVERAGE GLUCOSE BLD GHB EST-MCNC: 111 MG/DL
EST. AVERAGE GLUCOSE BLD GHB EST-SCNC: 6.2 MMOL/L
HBA1C MFR BLD: 5.5 %
HBV SURFACE AG SERPL QL IA: NORMAL
HCV AB SERPL QL IA: NORMAL
HIV 1+2 AB+HIV1 P24 AG SERPL QL IA: NORMAL
RUBV IGG SERPL IA-ACNC: 1.09 INDEX
T PALLIDUM AB SER QL IA: NEGATIVE

## 2025-05-13 ENCOUNTER — APPOINTMENT (OUTPATIENT)
Dept: OBSTETRICS AND GYNECOLOGY | Facility: CLINIC | Age: 30
End: 2025-05-13
Payer: COMMERCIAL

## 2025-05-13 ENCOUNTER — TELEPHONE (OUTPATIENT)
Facility: CLINIC | Age: 30
End: 2025-05-13

## 2025-05-13 NOTE — TELEPHONE ENCOUNTER
----- Message from Libia Negrete sent at 5/12/2025 11:26 AM EDT -----  Regarding: Risk-reducing cfDNA screening  Can you let her know her cfDNA screening was risk-reducing?  You can also let her know gender if she wants to know.  Thanks!  ----- Message -----  From: Interface, Onbase - Scan In  Sent: 5/9/2025   4:49 PM EDT  To: Libia Negrete MD

## 2025-05-14 ENCOUNTER — APPOINTMENT (OUTPATIENT)
Dept: OBSTETRICS AND GYNECOLOGY | Facility: CLINIC | Age: 30
End: 2025-05-14
Payer: COMMERCIAL

## 2025-05-15 LAB
COMMENTS - MP RESULT TYPE: NORMAL
SCAN RESULT: NORMAL

## 2025-05-20 ENCOUNTER — HOSPITAL ENCOUNTER (OUTPATIENT)
Dept: RADIOLOGY | Facility: CLINIC | Age: 30
Discharge: HOME | End: 2025-05-20
Payer: COMMERCIAL

## 2025-05-20 DIAGNOSIS — Z34.80 SUPERVISION OF OTHER NORMAL PREGNANCY, ANTEPARTUM (HHS-HCC): ICD-10-CM

## 2025-05-20 PROCEDURE — 76813 OB US NUCHAL MEAS 1 GEST: CPT

## 2025-05-27 ENCOUNTER — APPOINTMENT (OUTPATIENT)
Facility: CLINIC | Age: 30
End: 2025-05-27
Payer: COMMERCIAL

## 2025-05-28 ENCOUNTER — APPOINTMENT (OUTPATIENT)
Dept: OBSTETRICS AND GYNECOLOGY | Facility: CLINIC | Age: 30
End: 2025-05-28
Payer: COMMERCIAL

## 2025-05-28 ENCOUNTER — ROUTINE PRENATAL (OUTPATIENT)
Dept: OBSTETRICS AND GYNECOLOGY | Facility: CLINIC | Age: 30
End: 2025-05-28
Payer: COMMERCIAL

## 2025-05-28 VITALS — DIASTOLIC BLOOD PRESSURE: 66 MMHG | SYSTOLIC BLOOD PRESSURE: 104 MMHG | BODY MASS INDEX: 25.58 KG/M2 | WEIGHT: 149 LBS

## 2025-05-28 DIAGNOSIS — O09.292: ICD-10-CM

## 2025-05-28 DIAGNOSIS — Z3A.14 14 WEEKS GESTATION OF PREGNANCY (HHS-HCC): Primary | ICD-10-CM

## 2025-05-28 PROCEDURE — 0501F PRENATAL FLOW SHEET: CPT | Performed by: ADVANCED PRACTICE MIDWIFE

## 2025-05-28 NOTE — PROGRESS NOTES
Has anatamy scan scheduled for 25    Subjective     Snehal Ball is a 30 y.o.  at 14w0d with a working estimated date of delivery of 2025, by Ultrasound who presents for a routine prenatal visit.     She denies vaginal bleeding, leakage of fluid, , or contractions.    Objective   Physical Exam  Weight: 67.6 kg (149 lb)  TW.082 kg (9 lb)  BP: 104/66       Postpartum Depression: Low Risk  (2025)    [place]  Depression Scale     Last EPDS Total Score: 4     Last EPDS Self Harm Result: Never           Assessment/Plan   Problem List Items Addressed This Visit          Medium    14 weeks gestation of pregnancy ([facility]) - Primary    Overview   Desired provider in labor: [] CNM  [x] Physician   [] Either Acceptable  [x] Blood Products: [x] Yes, accepts [] No, needs counseling  [x] Initial BMI: 24.02   [x] Prenatal Labs: WNL  [x] Cervical Cancer Screening up to date: 2022 - normal - plan pap at postpartum visit   [x] Rh status: negative --> Rhogam at 28 weeks   [x] Screen for IPV and Substance Use Risk  [x] Genetic Screening (cfDNA):  ->risk reducing, female   [x] First Trimester Anatomy Screen (11-13.6 wks):  WNL  [x] Baby ASA: ->advised    [x] Pregnancy dated by: 6 week USN in office (not c/w LMP)    [] Anatomy US: (19-20 wks)  [] Federal Sterilization consent signed (if indicated):  [] 1hr GCT at 24-28wks:  [] Rhogam (if indicated):   [] Fetal Surveillance (if indicated):  [] Tdap (27-32 wks, may be given up to 36 wks if initial window missed):   [] RSV (32-36 wks) (Sept. to end of ):     [] Feeding Intentions:  [] Postpartum Birth control method:   [] GBS at 36 - 37 wks:  [x] 39 weeks discussion of IOL vs. Expectant management: RCS at 39 weeks   [x] Mode of delivery ( anticipated ): RCS            History of intrauterine growth restriction in prior pregnancy, currently pregnant in second trimester ([facility])    Overview   <> growth USN           Anatomy US scheduled  Start  baby aspirin, message sent after visit with instructions   Reviewed s/sx of PTL, warning signs, fetal movement counts, and when to call provider  Follow up in 4 weeks for a routine prenatal visit.    MANUELA Marie-TAL

## 2025-06-25 ENCOUNTER — APPOINTMENT (OUTPATIENT)
Dept: OBSTETRICS AND GYNECOLOGY | Facility: CLINIC | Age: 30
End: 2025-06-25
Payer: COMMERCIAL

## 2025-06-25 VITALS — BODY MASS INDEX: 26.26 KG/M2 | WEIGHT: 153 LBS | SYSTOLIC BLOOD PRESSURE: 115 MMHG | DIASTOLIC BLOOD PRESSURE: 68 MMHG

## 2025-06-25 DIAGNOSIS — Z98.891 HISTORY OF CESAREAN DELIVERY: ICD-10-CM

## 2025-06-25 DIAGNOSIS — O09.292: ICD-10-CM

## 2025-06-25 DIAGNOSIS — Z3A.18 18 WEEKS GESTATION OF PREGNANCY (HHS-HCC): Primary | ICD-10-CM

## 2025-06-25 PROCEDURE — 0501F PRENATAL FLOW SHEET: CPT | Performed by: OBSTETRICS & GYNECOLOGY

## 2025-06-25 NOTE — PROGRESS NOTES
Routine prenatal visit     Subjective    HPI:  Routine OB follow up. Pt feeling well with no concerns.  Feeling small fetal movements now. Has anatomy USN scheduled.     Objective    Vital Signs  /68   Wt 69.4 kg (153 lb)   LMP 2025   BMI 26.26 kg/m²     Snehal Ball is a 30 y.o. yo  at 18w0d here for the following concerns which we addressed today:     Medical Problems       Problem List       18 weeks gestation of pregnancy ([facility])    Overview Addendum 2025  5:02 PM by Libia Negrete MD   Desired provider in labor: [] CNM  [x] Physician   [] Either Acceptable  [x] Blood Products: [x] Yes, accepts [] No, needs counseling  [x] Initial BMI: 24.02   [x] Prenatal Labs: WNL  [x] Cervical Cancer Screening up to date: 2022 - normal - plan pap at postpartum visit   [x] Rh status: negative --> Rhogam at 28 weeks   [x] Screen for IPV and Substance Use Risk  [x] Genetic Screening (cfDNA):  ->risk reducing, female   [x] First Trimester Anatomy Screen (11-13.6 wks):  WNL  [x] Baby ASA: Declines   [x] Pregnancy dated by: 6 week USN in office (not c/w LMP)    [] Anatomy US: (19-20 wks): scheduled   [] Federal Sterilization consent signed (if indicated):  [] 1hr GCT at 24-28wks:  [] Rhogam:  [] Fetal Surveillance (if indicated):  [] Tdap (27-32 wks, may be given up to 36 wks if initial window missed):   [] RSV (32-36 wks) (Sept. to end of ):     [] Feeding Intentions:  [] Postpartum Birth control method:   [] GBS at 36 - 37 wks:  [x] 39 weeks discussion of IOL vs. Expectant management: RCS at 39 weeks   [x] Mode of delivery ( anticipated ): RCS            History of  delivery    Overview Signed 2025  2:19 PM by Libia Negrete MD   - c/s for breech and growth restriction at 37.3 in 2024   - [facility] 70.9%  - 17 month interdelivery interval --> not candidate for TOLAC at [facility]   - wants RCS  <> schedule RCS          History of intrauterine growth restriction in prior  pregnancy, currently pregnant in second trimester ([facility])    Overview Signed 4/7/2025  2:22 PM by Libia Negrete MD   <> growth USN              Follow up in 4 week(s).

## 2025-07-01 ENCOUNTER — HOSPITAL ENCOUNTER (OUTPATIENT)
Dept: RADIOLOGY | Facility: CLINIC | Age: 30
Discharge: HOME | End: 2025-07-01
Payer: COMMERCIAL

## 2025-07-01 DIAGNOSIS — Z34.80 SUPERVISION OF OTHER NORMAL PREGNANCY, ANTEPARTUM (HHS-HCC): ICD-10-CM

## 2025-07-01 DIAGNOSIS — O35.9XX0 MATERNAL CARE FOR (SUSPECTED) FETAL ABNORMALITY AND DAMAGE, UNSPECIFIED, NOT APPLICABLE OR UNSPECIFIED: ICD-10-CM

## 2025-07-01 PROCEDURE — 76805 OB US >/= 14 WKS SNGL FETUS: CPT

## 2025-07-01 PROCEDURE — 76811 OB US DETAILED SNGL FETUS: CPT

## 2025-07-23 ENCOUNTER — APPOINTMENT (OUTPATIENT)
Dept: OBSTETRICS AND GYNECOLOGY | Facility: CLINIC | Age: 30
End: 2025-07-23
Payer: COMMERCIAL

## 2025-07-23 VITALS — SYSTOLIC BLOOD PRESSURE: 118 MMHG | BODY MASS INDEX: 27.6 KG/M2 | DIASTOLIC BLOOD PRESSURE: 74 MMHG | WEIGHT: 160.8 LBS

## 2025-07-23 DIAGNOSIS — Z3A.22 22 WEEKS GESTATION OF PREGNANCY (HHS-HCC): Primary | ICD-10-CM

## 2025-07-23 PROCEDURE — 0501F PRENATAL FLOW SHEET: CPT | Performed by: OBSTETRICS & GYNECOLOGY

## 2025-08-25 ENCOUNTER — APPOINTMENT (OUTPATIENT)
Dept: OBSTETRICS AND GYNECOLOGY | Facility: CLINIC | Age: 30
End: 2025-08-25
Payer: COMMERCIAL

## 2025-08-25 ENCOUNTER — ROUTINE PRENATAL (OUTPATIENT)
Dept: OBSTETRICS AND GYNECOLOGY | Facility: CLINIC | Age: 30
End: 2025-08-25
Payer: COMMERCIAL

## 2025-08-25 VITALS — BODY MASS INDEX: 29.7 KG/M2 | WEIGHT: 173 LBS | SYSTOLIC BLOOD PRESSURE: 124 MMHG | DIASTOLIC BLOOD PRESSURE: 73 MMHG

## 2025-08-25 DIAGNOSIS — Z3A.26 26 WEEKS GESTATION OF PREGNANCY (HHS-HCC): Primary | ICD-10-CM

## 2025-08-25 DIAGNOSIS — Z13.1 SCREENING FOR DIABETES MELLITUS: ICD-10-CM

## 2025-08-25 DIAGNOSIS — Z98.891 HISTORY OF CESAREAN DELIVERY: ICD-10-CM

## 2025-08-25 DIAGNOSIS — O09.292: ICD-10-CM

## 2025-08-25 DIAGNOSIS — O34.219 PREVIOUS CESAREAN SECTION COMPLICATING PREGNANCY (HHS-HCC): ICD-10-CM

## 2025-08-25 PROCEDURE — 85027 COMPLETE CBC AUTOMATED: CPT

## 2025-08-25 PROCEDURE — 0501F PRENATAL FLOW SHEET: CPT | Performed by: OBSTETRICS & GYNECOLOGY

## 2025-08-25 ASSESSMENT — EDINBURGH POSTNATAL DEPRESSION SCALE (EPDS)
THE THOUGHT OF HARMING MYSELF HAS OCCURRED TO ME: NEVER
I HAVE BEEN ANXIOUS OR WORRIED FOR NO GOOD REASON: YES, SOMETIMES
I HAVE BLAMED MYSELF UNNECESSARILY WHEN THINGS WENT WRONG: NOT VERY OFTEN
THINGS HAVE BEEN GETTING ON TOP OF ME: NO, MOST OF THE TIME I HAVE COPED QUITE WELL
I HAVE FELT SAD OR MISERABLE: NO, NOT AT ALL
I HAVE LOOKED FORWARD WITH ENJOYMENT TO THINGS: AS MUCH AS I EVER DID
I HAVE BEEN SO UNHAPPY THAT I HAVE HAD DIFFICULTY SLEEPING: NOT AT ALL
TOTAL SCORE: 5
I HAVE BEEN SO UNHAPPY THAT I HAVE BEEN CRYING: NO, NEVER
I HAVE BEEN ABLE TO LAUGH AND SEE THE FUNNY SIDE OF THINGS: AS MUCH AS I ALWAYS COULD
I HAVE FELT SCARED OR PANICKY FOR NO GOOD REASON: NO, NOT MUCH

## 2025-08-26 ENCOUNTER — PREP FOR PROCEDURE (OUTPATIENT)
Dept: OBSTETRICS AND GYNECOLOGY | Facility: CLINIC | Age: 30
End: 2025-08-26
Payer: COMMERCIAL

## 2025-08-26 DIAGNOSIS — O34.219 PREVIOUS CESAREAN SECTION COMPLICATING PREGNANCY (HHS-HCC): Primary | ICD-10-CM

## 2025-08-26 PROBLEM — Z3A.26 26 WEEKS GESTATION OF PREGNANCY (HHS-HCC): Status: ACTIVE | Noted: 2025-04-07

## 2025-08-26 LAB
ERYTHROCYTE [DISTWIDTH] IN BLOOD BY AUTOMATED COUNT: 13.2 % (ref 11.5–14.5)
HCT VFR BLD AUTO: 37.3 % (ref 36–46)
HGB BLD-MCNC: 12.4 G/DL (ref 12–16)
MCH RBC QN AUTO: 30.2 PG (ref 26–34)
MCHC RBC AUTO-ENTMCNC: 33.2 G/DL (ref 32–36)
MCV RBC AUTO: 91 FL (ref 80–100)
NRBC BLD-RTO: 0 /100 WBCS (ref 0–0)
PLATELET # BLD AUTO: 256 X10*3/UL (ref 150–450)
RBC # BLD AUTO: 4.11 X10*6/UL (ref 4–5.2)
REFLEX ADDED, ANEMIA PANEL: NORMAL
WBC # BLD AUTO: 10.2 X10*3/UL (ref 4.4–11.3)

## 2025-08-28 LAB
GLUCOSE 1H P 50 G GLC PO SERPL-MCNC: 104 MG/DL
T PALLIDUM AB SER QL IA: NEGATIVE

## 2025-09-09 ENCOUNTER — APPOINTMENT (OUTPATIENT)
Dept: OBSTETRICS AND GYNECOLOGY | Facility: CLINIC | Age: 30
End: 2025-09-09
Payer: COMMERCIAL

## 2025-09-24 ENCOUNTER — APPOINTMENT (OUTPATIENT)
Dept: OBSTETRICS AND GYNECOLOGY | Facility: CLINIC | Age: 30
End: 2025-09-24
Payer: COMMERCIAL

## 2025-10-07 ENCOUNTER — APPOINTMENT (OUTPATIENT)
Dept: OBSTETRICS AND GYNECOLOGY | Facility: CLINIC | Age: 30
End: 2025-10-07
Payer: COMMERCIAL

## 2025-10-21 ENCOUNTER — APPOINTMENT (OUTPATIENT)
Dept: OBSTETRICS AND GYNECOLOGY | Facility: CLINIC | Age: 30
End: 2025-10-21
Payer: COMMERCIAL

## 2025-11-04 ENCOUNTER — APPOINTMENT (OUTPATIENT)
Dept: OBSTETRICS AND GYNECOLOGY | Facility: CLINIC | Age: 30
End: 2025-11-04
Payer: COMMERCIAL

## 2025-11-14 ENCOUNTER — APPOINTMENT (OUTPATIENT)
Dept: OBSTETRICS AND GYNECOLOGY | Facility: CLINIC | Age: 30
End: 2025-11-14
Payer: COMMERCIAL

## 2025-11-19 ENCOUNTER — APPOINTMENT (OUTPATIENT)
Dept: OBSTETRICS AND GYNECOLOGY | Facility: CLINIC | Age: 30
End: 2025-11-19
Payer: COMMERCIAL

## (undated) DEVICE — Device